# Patient Record
Sex: MALE | Race: OTHER | HISPANIC OR LATINO | URBAN - METROPOLITAN AREA
[De-identification: names, ages, dates, MRNs, and addresses within clinical notes are randomized per-mention and may not be internally consistent; named-entity substitution may affect disease eponyms.]

---

## 2022-12-15 ENCOUNTER — INPATIENT (INPATIENT)
Facility: HOSPITAL | Age: 57
LOS: 3 days | Discharge: HOME | DRG: 517 | End: 2022-12-19
Payer: OTHER MISCELLANEOUS

## 2022-12-15 ENCOUNTER — TRANSCRIPTION ENCOUNTER (OUTPATIENT)
Age: 57
End: 2022-12-15

## 2022-12-15 VITALS
OXYGEN SATURATION: 98 % | TEMPERATURE: 98 F | HEART RATE: 82 BPM | DIASTOLIC BLOOD PRESSURE: 75 MMHG | WEIGHT: 195.11 LBS | RESPIRATION RATE: 18 BRPM | SYSTOLIC BLOOD PRESSURE: 123 MMHG

## 2022-12-15 DIAGNOSIS — M54.50 LOW BACK PAIN, UNSPECIFIED: ICD-10-CM

## 2022-12-15 LAB
ALBUMIN SERPL ELPH-MCNC: 4.2 G/DL — SIGNIFICANT CHANGE UP (ref 3.3–5)
ALP SERPL-CCNC: 68 U/L — SIGNIFICANT CHANGE UP (ref 40–120)
ALT FLD-CCNC: 45 U/L — SIGNIFICANT CHANGE UP (ref 10–45)
ANION GAP SERPL CALC-SCNC: 9 MMOL/L — SIGNIFICANT CHANGE UP (ref 5–17)
APTT BLD: 36.6 SEC — HIGH (ref 27.5–35.5)
AST SERPL-CCNC: 36 U/L — SIGNIFICANT CHANGE UP (ref 10–40)
BASOPHILS # BLD AUTO: 0.04 K/UL — SIGNIFICANT CHANGE UP (ref 0–0.2)
BASOPHILS NFR BLD AUTO: 0.5 % — SIGNIFICANT CHANGE UP (ref 0–2)
BILIRUB SERPL-MCNC: 0.5 MG/DL — SIGNIFICANT CHANGE UP (ref 0.2–1.2)
BLD GP AB SCN SERPL QL: NEGATIVE — SIGNIFICANT CHANGE UP
BUN SERPL-MCNC: 16 MG/DL — SIGNIFICANT CHANGE UP (ref 7–23)
CALCIUM SERPL-MCNC: 9.6 MG/DL — SIGNIFICANT CHANGE UP (ref 8.4–10.5)
CHLORIDE SERPL-SCNC: 99 MMOL/L — SIGNIFICANT CHANGE UP (ref 96–108)
CO2 SERPL-SCNC: 28 MMOL/L — SIGNIFICANT CHANGE UP (ref 22–31)
CREAT SERPL-MCNC: 1.17 MG/DL — SIGNIFICANT CHANGE UP (ref 0.5–1.3)
EGFR: 72 ML/MIN/1.73M2 — SIGNIFICANT CHANGE UP
EOSINOPHIL # BLD AUTO: 0.06 K/UL — SIGNIFICANT CHANGE UP (ref 0–0.5)
EOSINOPHIL NFR BLD AUTO: 0.8 % — SIGNIFICANT CHANGE UP (ref 0–6)
GLUCOSE SERPL-MCNC: 101 MG/DL — HIGH (ref 70–99)
HCT VFR BLD CALC: 51.6 % — HIGH (ref 39–50)
HGB BLD-MCNC: 17.5 G/DL — HIGH (ref 13–17)
IMM GRANULOCYTES NFR BLD AUTO: 0.5 % — SIGNIFICANT CHANGE UP (ref 0–0.9)
INR BLD: 1.01 — SIGNIFICANT CHANGE UP (ref 0.88–1.16)
LYMPHOCYTES # BLD AUTO: 1.74 K/UL — SIGNIFICANT CHANGE UP (ref 1–3.3)
LYMPHOCYTES # BLD AUTO: 22.6 % — SIGNIFICANT CHANGE UP (ref 13–44)
MCHC RBC-ENTMCNC: 29.4 PG — SIGNIFICANT CHANGE UP (ref 27–34)
MCHC RBC-ENTMCNC: 33.9 GM/DL — SIGNIFICANT CHANGE UP (ref 32–36)
MCV RBC AUTO: 86.6 FL — SIGNIFICANT CHANGE UP (ref 80–100)
MONOCYTES # BLD AUTO: 0.82 K/UL — SIGNIFICANT CHANGE UP (ref 0–0.9)
MONOCYTES NFR BLD AUTO: 10.6 % — SIGNIFICANT CHANGE UP (ref 2–14)
NEUTROPHILS # BLD AUTO: 5 K/UL — SIGNIFICANT CHANGE UP (ref 1.8–7.4)
NEUTROPHILS NFR BLD AUTO: 65 % — SIGNIFICANT CHANGE UP (ref 43–77)
NRBC # BLD: 0 /100 WBCS — SIGNIFICANT CHANGE UP (ref 0–0)
PLATELET # BLD AUTO: 225 K/UL — SIGNIFICANT CHANGE UP (ref 150–400)
POTASSIUM SERPL-MCNC: 4.3 MMOL/L — SIGNIFICANT CHANGE UP (ref 3.5–5.3)
POTASSIUM SERPL-SCNC: 4.3 MMOL/L — SIGNIFICANT CHANGE UP (ref 3.5–5.3)
PROT SERPL-MCNC: 7.3 G/DL — SIGNIFICANT CHANGE UP (ref 6–8.3)
PROTHROM AB SERPL-ACNC: 12 SEC — SIGNIFICANT CHANGE UP (ref 10.5–13.4)
RBC # BLD: 5.96 M/UL — HIGH (ref 4.2–5.8)
RBC # FLD: 12.5 % — SIGNIFICANT CHANGE UP (ref 10.3–14.5)
RH IG SCN BLD-IMP: POSITIVE — SIGNIFICANT CHANGE UP
SARS-COV-2 RNA SPEC QL NAA+PROBE: SIGNIFICANT CHANGE UP
SODIUM SERPL-SCNC: 136 MMOL/L — SIGNIFICANT CHANGE UP (ref 135–145)
WBC # BLD: 7.7 K/UL — SIGNIFICANT CHANGE UP (ref 3.8–10.5)
WBC # FLD AUTO: 7.7 K/UL — SIGNIFICANT CHANGE UP (ref 3.8–10.5)

## 2022-12-15 PROCEDURE — 99222 1ST HOSP IP/OBS MODERATE 55: CPT

## 2022-12-15 PROCEDURE — 71046 X-RAY EXAM CHEST 2 VIEWS: CPT | Mod: 26

## 2022-12-15 PROCEDURE — 99285 EMERGENCY DEPT VISIT HI MDM: CPT | Mod: 25

## 2022-12-15 PROCEDURE — 93010 ELECTROCARDIOGRAM REPORT: CPT

## 2022-12-15 RX ORDER — CHLORHEXIDINE GLUCONATE 213 G/1000ML
1 SOLUTION TOPICAL ONCE
Refills: 0 | Status: COMPLETED | OUTPATIENT
Start: 2022-12-16 | End: 2022-12-16

## 2022-12-15 RX ORDER — BUPROPION HYDROCHLORIDE 150 MG/1
0 TABLET, EXTENDED RELEASE ORAL
Qty: 0 | Refills: 0 | DISCHARGE

## 2022-12-15 RX ORDER — LANOLIN ALCOHOL/MO/W.PET/CERES
5 CREAM (GRAM) TOPICAL AT BEDTIME
Refills: 0 | Status: DISCONTINUED | OUTPATIENT
Start: 2022-12-15 | End: 2022-12-19

## 2022-12-15 RX ORDER — HYDROMORPHONE HYDROCHLORIDE 2 MG/ML
0.5 INJECTION INTRAMUSCULAR; INTRAVENOUS; SUBCUTANEOUS EVERY 4 HOURS
Refills: 0 | Status: DISCONTINUED | OUTPATIENT
Start: 2022-12-15 | End: 2022-12-19

## 2022-12-15 RX ORDER — ACETAMINOPHEN 500 MG
975 TABLET ORAL EVERY 8 HOURS
Refills: 0 | Status: DISCONTINUED | OUTPATIENT
Start: 2022-12-15 | End: 2022-12-19

## 2022-12-15 RX ORDER — OXYCODONE HYDROCHLORIDE 5 MG/1
10 TABLET ORAL EVERY 4 HOURS
Refills: 0 | Status: DISCONTINUED | OUTPATIENT
Start: 2022-12-15 | End: 2022-12-17

## 2022-12-15 RX ORDER — POVIDONE-IODINE 5 %
1 AEROSOL (ML) TOPICAL ONCE
Refills: 0 | Status: COMPLETED | OUTPATIENT
Start: 2022-12-16 | End: 2022-12-16

## 2022-12-15 RX ORDER — OXYCODONE HYDROCHLORIDE 5 MG/1
5 TABLET ORAL EVERY 4 HOURS
Refills: 0 | Status: DISCONTINUED | OUTPATIENT
Start: 2022-12-15 | End: 2022-12-17

## 2022-12-15 RX ORDER — SENNA PLUS 8.6 MG/1
2 TABLET ORAL AT BEDTIME
Refills: 0 | Status: DISCONTINUED | OUTPATIENT
Start: 2022-12-15 | End: 2022-12-19

## 2022-12-15 RX ORDER — ONDANSETRON 8 MG/1
4 TABLET, FILM COATED ORAL EVERY 8 HOURS
Refills: 0 | Status: DISCONTINUED | OUTPATIENT
Start: 2022-12-15 | End: 2022-12-19

## 2022-12-15 RX ORDER — SODIUM CHLORIDE 9 MG/ML
1000 INJECTION, SOLUTION INTRAVENOUS
Refills: 0 | Status: DISCONTINUED | OUTPATIENT
Start: 2022-12-15 | End: 2022-12-19

## 2022-12-15 RX ORDER — APREPITANT 80 MG/1
40 CAPSULE ORAL ONCE
Refills: 0 | Status: COMPLETED | OUTPATIENT
Start: 2022-12-16 | End: 2022-12-16

## 2022-12-15 RX ADMIN — OXYCODONE HYDROCHLORIDE 10 MILLIGRAM(S): 5 TABLET ORAL at 15:35

## 2022-12-15 RX ADMIN — Medication 975 MILLIGRAM(S): at 21:14

## 2022-12-15 RX ADMIN — Medication 975 MILLIGRAM(S): at 22:14

## 2022-12-15 RX ADMIN — HYDROMORPHONE HYDROCHLORIDE 0.5 MILLIGRAM(S): 2 INJECTION INTRAMUSCULAR; INTRAVENOUS; SUBCUTANEOUS at 18:18

## 2022-12-15 RX ADMIN — OXYCODONE HYDROCHLORIDE 10 MILLIGRAM(S): 5 TABLET ORAL at 14:35

## 2022-12-15 RX ADMIN — SENNA PLUS 2 TABLET(S): 8.6 TABLET ORAL at 21:36

## 2022-12-15 RX ADMIN — HYDROMORPHONE HYDROCHLORIDE 0.5 MILLIGRAM(S): 2 INJECTION INTRAMUSCULAR; INTRAVENOUS; SUBCUTANEOUS at 18:33

## 2022-12-15 NOTE — ED PROVIDER NOTE - OBJECTIVE STATEMENT
Patient is a 57-year-old male history of L5-S1 laminectomy, bulging disk to L3/4, with neuroforaminal stenosis, who presents with acute on chronic low back pain described as pain across lower back worse to the right, radiating down bilateral lower extremities with associated numbness to bilateral lower extremities.  Patient denies any bowel or bladder dysfunction, saddle anesthesia.  Patient does admit to weakness of the lower extremities with legs giving out on him periodically.  Patient is now walking with a cane due to back pain.  No fevers, chills, abdominal pain, dysuria, hematuria.  Patient was referred to the ED for admission for back surgery.

## 2022-12-15 NOTE — CONSULT NOTE ADULT - SUBJECTIVE AND OBJECTIVE BOX
Patient is a 57y old  Male who presents with a chief complaint of  Low back pain       HPI : 57 year old male admitted to the hospital with Low back pain , he has anxiety takes bupropion 100mg daily , no other medical issues, his physical activity is limited by low back pain and radiculopathy but participates in isolated upper extremity and chest exercise for 40 mins 3-4 times a week and reports no angina or angina equivalents. He has no hx of CAD , CHF , MI , Renal failure , DM     REVIEW OF SYSTEMS: 12 point review of systems negative except those stated else where in the note       PAST MEDICAL & SURGICAL HISTORY: Back Surgery in 1995       Social History:  Denies cigarette smoking/etoh drinking  "occasioanlly" smokes cigars (15 Dec 2022 12:29)      FAMILY HISTORY: No family hx of early cad , stroke , cancer       Home Medications:  buPROPion 100 mg oral tablet: orally once a day (15 Dec 2022 12:27)      MEDICATIONS  (STANDING):  acetaminophen     Tablet .. 975 milliGRAM(s) Oral every 8 hours  lactated ringers. 1000 milliLiter(s) (120 mL/Hr) IV Continuous <Continuous>  senna 2 Tablet(s) Oral at bedtime    MEDICATIONS  (PRN):  bisacodyl 5 milliGRAM(s) Oral every 12 hours PRN Constipation  HYDROmorphone  Injectable 0.5 milliGRAM(s) IV Push every 4 hours PRN breakthrough pain  ondansetron Injectable 4 milliGRAM(s) IV Push every 8 hours PRN Nausea and/or Vomiting  oxyCODONE    IR 10 milliGRAM(s) Oral every 4 hours PRN Severe Pain (7 - 10)  oxyCODONE    IR 5 milliGRAM(s) Oral every 4 hours PRN Moderate Pain (4 - 6)      Vital Signs Last 24 Hrs  T(C): 36.6 (15 Dec 2022 14:29), Max: 36.8 (15 Dec 2022 10:50)  T(F): 97.8 (15 Dec 2022 14:29), Max: 98.2 (15 Dec 2022 10:50)  HR: 83 (15 Dec 2022 14:29) (82 - 83)  BP: 128/91 (15 Dec 2022 14:29) (123/75 - 128/91)  BP(mean): --  RR: 18 (15 Dec 2022 14:29) (18 - 18)  SpO2: 98% (15 Dec 2022 14:29) (98% - 98%)        LABS:                        17.5   7.70  )-----------( 225      ( 15 Dec 2022 12:19 )             51.6     12-15    136  |  99  |  16  ----------------------------<  101<H>  4.3   |  28  |  1.17    Ca    9.6      15 Dec 2022 12:19    TPro  7.3  /  Alb  4.2  /  TBili  0.5  /  DBili  x   /  AST  36  /  ALT  45  /  AlkPhos  68  12-15    PT/INR - ( 15 Dec 2022 12:19 )   PT: 12.0 sec;   INR: 1.01          PTT - ( 15 Dec 2022 12:19 )  PTT:36.6 sec    CAPILLARY BLOOD GLUCOSE                    RADIOLOGY & ADDITIONAL TESTS:    Imaging personally reviewed and radiologists report reviewed     Consultant(s) Notes Reviewed    PHYSICAL EXAM:  GENERAL: not in distress   HEAD, EYES: EOMI, PERRLA, conjunctiva and sclera clear  ENMT:  Moist mucous membranes, Good dentition, No lesions  NECK: Supple, No JVD, Normal thyroid  NERVOUS SYSTEM:  Alert & Oriented X3, Good concentration; Motor Strength 5/5 B/L upper and lower extremities; DTRs 2+ intact and symmetric  CHEST/LUNG: Clear to auscultation  bilaterally; No rales, rhonchi, wheezing,   HEART: Regular rate and rhythm; No murmurs, rubs, or gallops  ABDOMEN: Soft, Nontender, Nondistended; Bowel sounds present  EXTREMITIES:  2+ Peripheral Pulses, No clubbing, cyanosis, or edema  LYMPH: No lymphadenopathy noted  SKIN: No rashes or lesions    Care Discussed with Primary team  Patient is a 57y old  Male who presents with a chief complaint of  Low back pain       HPI : 57 year old male admitted to the hospital with Low back pain , he has anxiety takes bupropion 100mg daily , no other medical issues, his physical activity is limited by low back pain and radiculopathy but participates in isolated upper extremity and chest exercise for 40 mins 3-4 times a week and reports no angina or angina equivalents. He has no hx of CAD , CHF , MI , Renal failure , DM     REVIEW OF SYSTEMS: 12 point review of systems negative except those stated else where in the note       PAST MEDICAL & SURGICAL HISTORY: Back Surgery in 1995       Social History:  Denies cigarette smoking/etoh drinking  "occasioanlly" smokes cigars (15 Dec 2022 12:29)      FAMILY HISTORY: No family hx of early cad , stroke , cancer       Home Medications:  buPROPion 100 mg oral tablet: orally once a day (15 Dec 2022 12:27)      MEDICATIONS  (STANDING):  acetaminophen     Tablet .. 975 milliGRAM(s) Oral every 8 hours  lactated ringers. 1000 milliLiter(s) (120 mL/Hr) IV Continuous <Continuous>  senna 2 Tablet(s) Oral at bedtime    MEDICATIONS  (PRN):  bisacodyl 5 milliGRAM(s) Oral every 12 hours PRN Constipation  HYDROmorphone  Injectable 0.5 milliGRAM(s) IV Push every 4 hours PRN breakthrough pain  ondansetron Injectable 4 milliGRAM(s) IV Push every 8 hours PRN Nausea and/or Vomiting  oxyCODONE    IR 10 milliGRAM(s) Oral every 4 hours PRN Severe Pain (7 - 10)  oxyCODONE    IR 5 milliGRAM(s) Oral every 4 hours PRN Moderate Pain (4 - 6)      Vital Signs Last 24 Hrs  T(C): 36.6 (15 Dec 2022 14:29), Max: 36.8 (15 Dec 2022 10:50)  T(F): 97.8 (15 Dec 2022 14:29), Max: 98.2 (15 Dec 2022 10:50)  HR: 83 (15 Dec 2022 14:29) (82 - 83)  BP: 128/91 (15 Dec 2022 14:29) (123/75 - 128/91)  BP(mean): --  RR: 18 (15 Dec 2022 14:29) (18 - 18)  SpO2: 98% (15 Dec 2022 14:29) (98% - 98%)        LABS:                        17.5   7.70  )-----------( 225      ( 15 Dec 2022 12:19 )             51.6     12-15    136  |  99  |  16  ----------------------------<  101<H>  4.3   |  28  |  1.17    Ca    9.6      15 Dec 2022 12:19    TPro  7.3  /  Alb  4.2  /  TBili  0.5  /  DBili  x   /  AST  36  /  ALT  45  /  AlkPhos  68  12-15    PT/INR - ( 15 Dec 2022 12:19 )   PT: 12.0 sec;   INR: 1.01          PTT - ( 15 Dec 2022 12:19 )  PTT:36.6 sec    CAPILLARY BLOOD GLUCOSE                    RADIOLOGY & ADDITIONAL TESTS:    Imaging personally reviewed and radiologists report reviewed     Consultant(s) Notes Reviewed    PHYSICAL EXAM:  GENERAL: not in distress   HEAD, EYES: EOMI, PERRLA, conjunctiva and sclera clear  ENMT:  Moist mucous membranes, Good dentition, No lesions  NECK: Supple, No JVD, Normal thyroid  NERVOUS SYSTEM:  Alert & Oriented X3, Good concentration; Motor Strength 5/5 B/L upper and lower extremities  CHEST/LUNG: Clear to auscultation  bilaterally; No rales, rhonchi, wheezing,   HEART: Regular rate and rhythm; No murmurs, rubs, or gallops  ABDOMEN: Soft, Nontender, Nondistended; Bowel sounds present  EXTREMITIES:  2+ Peripheral Pulses, No clubbing, cyanosis, or edema  LYMPH: No lymphadenopathy noted  SKIN: No rashes or lesions    Care Discussed with Primary team

## 2022-12-15 NOTE — ED PROVIDER NOTE - CLINICAL SUMMARY MEDICAL DECISION MAKING FREE TEXT BOX
Impression: h/o L5-S1 laminectomy, bulging disk to L3/4, with neuroforaminal stenosis, here w/ acute on chronic back pain w/ associated numbness and weakness of b/l lower ext, r > l. No saddle anesthesia, bowel/ bladder dysfunction. No s/s of cauda equina. AVSS. Pt seen by ortho and to be admitted to Dr. Rodriguez's [facility] [place] for surgery tomorrow.

## 2022-12-15 NOTE — H&P ADULT - HISTORY OF PRESENT ILLNESS
The patient is a 57 year old male with history of back pain, sent to the ED by Dr. Rodriguez for worsening pain and further evaluation. The patient states he has a history of back pain -he had a L5-S1 laminectomy in 1995. He states that after this surgery he had been progressing well but then had an injury in 2004 while doing heavy lifting at work. He states that ever since he has had worsening low back pain and numbness down his right lower extremity [he states he also does have some numbness of his left lower extremity but that his right leg numbness is much worse]. Is ambulatory at baseline but has been using a cane for assistance. Takes percocet/meloxicam for his pain. Denies any significant past medical history.

## 2022-12-15 NOTE — CONSULT NOTE ADULT - ASSESSMENT
57 year old male admitted to the hospital with back pain and radiculopathy     Impression and Plan   # Pre op Risk eval   - RCRI Class 1 , 0.4% risk of periop mace within 30 days   - Low risk of Pulm Complications   - No hx of bleeding   - EKG pending   - Medically optimized for Laminectomy pending EKG     # Anxiety   - Continue Bupropion ,    # DVT Prophylaxis  57 year old male admitted to the hospital with back pain and radiculopathy     Impression and Plan   # Pre op Risk eval   - RCRI Class 1 , 0.4% risk of periop mace within 30 days   - Low risk of Pulm Complications   - No hx of bleeding   - EKG Sinus rhythm , no ST-T changes ,   - Medically optimized for Laminectomy with above specified risk     # Anxiety   - Continue Bupropion ,    # DVT Prophylaxis

## 2022-12-15 NOTE — ED PROVIDER NOTE - PHYSICAL EXAMINATION
VITAL SIGNS: I have reviewed nursing notes and confirm.  CONSTITUTIONAL: Well appearing, in no acute distress.   SKIN:  warm and dry, no acute rash.   HEAD:  normocephalic, atraumatic.  EYES: EOM intact; conjunctiva and sclera clear.  ENT: No nasal discharge; airway clear.   NECK: Supple.  CARD: S1, S2, Regular rate and rhythm.   RESP:  Clear to auscultation b/l, no wheezes, rales or rhonchi.  ABD: Normal bowel sounds; soft; non-distended; non-tender; no guarding/ rebound.  BACK: No spinal tenderness. No cvat. + mild ttp to r buttock.   EXT: Normal ROM. No peripheral edema. Pulses intact and equal b/l.  NEURO: Alert, oriented, motor intact and equal b/l. Sensation subj decreased to rle from thigh distally.  PSYCH: Cooperative, mood and affect appropriate.

## 2022-12-15 NOTE — H&P ADULT - NSHPPHYSICALEXAM_GEN_ALL_CORE
Constitutional: vitals reviewed per nursing documentation, NAD, sitting comfortably in chair in the ED  Eyes: , no obvious deformity, ecchymoses, swelling of eyelids  Neck: trachea midline  Lungs: not using accessory muscles of respiration, normal respiratory effort  Neuro/Psych:  normal affect and mood  MSK:   Skin warm and well perfused. PT pulses palpable bilateral lower extremities. Sensation intact to bilateral lower extremities, diminished to right lower extremity compared to left lower extremity. EHL/TA/GS/FHL 5/5 bilateral lower extremities.

## 2022-12-15 NOTE — H&P ADULT - PROBLEM SELECTOR PLAN 1
Admit to Orthopaedic Service Dr. Rodriguez.   -Plan for OR tomorrow for L3-4 laminectomy, L5-S1 extraforaminal decompression.  -Dr. De Leon consulted for medical co-management/pre-op optimization for OR tomorrow.  -Preop labs/EKG pending.   -Pain control.   -NPO/IVF at midnight for OR tomorrow.

## 2022-12-15 NOTE — ED ADULT TRIAGE NOTE - CHIEF COMPLAINT QUOTE
Patient c/o worsening lower back pain radiating to right lower leg x 1 week.  Hx L3, L4, L5 S1 herniated disc.  Told to come in by Dr. Rodriguez.  Denies saddle anesthesia, recent falls / injury

## 2022-12-15 NOTE — PRE-OP CHECKLIST - SELECT TESTS ORDERED
EKG/CXR BMP/CBC/CMP/PT/PTT/INR/Type and Screen/EKG/CXR BMP/CBC/CMP/PT/PTT/INR/Type and Screen/EKG/CXR/COVID-19

## 2022-12-16 ENCOUNTER — TRANSCRIPTION ENCOUNTER (OUTPATIENT)
Age: 57
End: 2022-12-16

## 2022-12-16 DIAGNOSIS — M48.061 SPINAL STENOSIS, LUMBAR REGION WITHOUT NEUROGENIC CLAUDICATION: ICD-10-CM

## 2022-12-16 LAB
ANION GAP SERPL CALC-SCNC: 11 MMOL/L — SIGNIFICANT CHANGE UP (ref 5–17)
BLD GP AB SCN SERPL QL: NEGATIVE — SIGNIFICANT CHANGE UP
BUN SERPL-MCNC: 19 MG/DL — SIGNIFICANT CHANGE UP (ref 7–23)
CALCIUM SERPL-MCNC: 9.2 MG/DL — SIGNIFICANT CHANGE UP (ref 8.4–10.5)
CHLORIDE SERPL-SCNC: 100 MMOL/L — SIGNIFICANT CHANGE UP (ref 96–108)
CO2 SERPL-SCNC: 25 MMOL/L — SIGNIFICANT CHANGE UP (ref 22–31)
CREAT SERPL-MCNC: 1.16 MG/DL — SIGNIFICANT CHANGE UP (ref 0.5–1.3)
EGFR: 73 ML/MIN/1.73M2 — SIGNIFICANT CHANGE UP
GLUCOSE SERPL-MCNC: 108 MG/DL — HIGH (ref 70–99)
HCT VFR BLD CALC: 49.7 % — SIGNIFICANT CHANGE UP (ref 39–50)
HGB BLD-MCNC: 16.9 G/DL — SIGNIFICANT CHANGE UP (ref 13–17)
MCHC RBC-ENTMCNC: 29.6 PG — SIGNIFICANT CHANGE UP (ref 27–34)
MCHC RBC-ENTMCNC: 34 GM/DL — SIGNIFICANT CHANGE UP (ref 32–36)
MCV RBC AUTO: 87 FL — SIGNIFICANT CHANGE UP (ref 80–100)
NRBC # BLD: 0 /100 WBCS — SIGNIFICANT CHANGE UP (ref 0–0)
PLATELET # BLD AUTO: 219 K/UL — SIGNIFICANT CHANGE UP (ref 150–400)
POTASSIUM SERPL-MCNC: 3.9 MMOL/L — SIGNIFICANT CHANGE UP (ref 3.5–5.3)
POTASSIUM SERPL-SCNC: 3.9 MMOL/L — SIGNIFICANT CHANGE UP (ref 3.5–5.3)
RBC # BLD: 5.71 M/UL — SIGNIFICANT CHANGE UP (ref 4.2–5.8)
RBC # FLD: 12.4 % — SIGNIFICANT CHANGE UP (ref 10.3–14.5)
RH IG SCN BLD-IMP: POSITIVE — SIGNIFICANT CHANGE UP
SODIUM SERPL-SCNC: 136 MMOL/L — SIGNIFICANT CHANGE UP (ref 135–145)
WBC # BLD: 8.56 K/UL — SIGNIFICANT CHANGE UP (ref 3.8–10.5)
WBC # FLD AUTO: 8.56 K/UL — SIGNIFICANT CHANGE UP (ref 3.8–10.5)

## 2022-12-16 PROCEDURE — 99232 SBSQ HOSP IP/OBS MODERATE 35: CPT

## 2022-12-16 DEVICE — SURGIFOAM PAD 8CM X 12.5CM X 10MM (100): Type: IMPLANTABLE DEVICE | Status: FUNCTIONAL

## 2022-12-16 DEVICE — SURGIFLO HEMOSTATIC MATRIX KIT: Type: IMPLANTABLE DEVICE | Status: FUNCTIONAL

## 2022-12-16 RX ORDER — CYCLOBENZAPRINE HYDROCHLORIDE 10 MG/1
10 TABLET, FILM COATED ORAL EVERY 8 HOURS
Refills: 0 | Status: DISCONTINUED | OUTPATIENT
Start: 2022-12-16 | End: 2022-12-19

## 2022-12-16 RX ORDER — SODIUM CHLORIDE 9 MG/ML
1000 INJECTION, SOLUTION INTRAVENOUS
Refills: 0 | Status: DISCONTINUED | OUTPATIENT
Start: 2022-12-16 | End: 2022-12-19

## 2022-12-16 RX ORDER — HYDROMORPHONE HYDROCHLORIDE 2 MG/ML
0.5 INJECTION INTRAMUSCULAR; INTRAVENOUS; SUBCUTANEOUS
Refills: 0 | Status: DISCONTINUED | OUTPATIENT
Start: 2022-12-16 | End: 2022-12-16

## 2022-12-16 RX ORDER — CEFAZOLIN SODIUM 1 G
2000 VIAL (EA) INJECTION EVERY 8 HOURS
Refills: 0 | Status: COMPLETED | OUTPATIENT
Start: 2022-12-16 | End: 2022-12-17

## 2022-12-16 RX ORDER — POLYETHYLENE GLYCOL 3350 17 G/17G
17 POWDER, FOR SOLUTION ORAL DAILY
Refills: 0 | Status: DISCONTINUED | OUTPATIENT
Start: 2022-12-16 | End: 2022-12-19

## 2022-12-16 RX ADMIN — APREPITANT 40 MILLIGRAM(S): 80 CAPSULE ORAL at 09:22

## 2022-12-16 RX ADMIN — Medication 975 MILLIGRAM(S): at 05:19

## 2022-12-16 RX ADMIN — HYDROMORPHONE HYDROCHLORIDE 0.5 MILLIGRAM(S): 2 INJECTION INTRAMUSCULAR; INTRAVENOUS; SUBCUTANEOUS at 16:48

## 2022-12-16 RX ADMIN — HYDROMORPHONE HYDROCHLORIDE 0.5 MILLIGRAM(S): 2 INJECTION INTRAMUSCULAR; INTRAVENOUS; SUBCUTANEOUS at 16:33

## 2022-12-16 RX ADMIN — Medication 975 MILLIGRAM(S): at 22:08

## 2022-12-16 RX ADMIN — HYDROMORPHONE HYDROCHLORIDE 0.5 MILLIGRAM(S): 2 INJECTION INTRAMUSCULAR; INTRAVENOUS; SUBCUTANEOUS at 15:00

## 2022-12-16 RX ADMIN — Medication 1 APPLICATION(S): at 09:27

## 2022-12-16 RX ADMIN — HYDROMORPHONE HYDROCHLORIDE 0.5 MILLIGRAM(S): 2 INJECTION INTRAMUSCULAR; INTRAVENOUS; SUBCUTANEOUS at 14:42

## 2022-12-16 RX ADMIN — SODIUM CHLORIDE 120 MILLILITER(S): 9 INJECTION, SOLUTION INTRAVENOUS at 00:40

## 2022-12-16 RX ADMIN — OXYCODONE HYDROCHLORIDE 10 MILLIGRAM(S): 5 TABLET ORAL at 22:07

## 2022-12-16 RX ADMIN — HYDROMORPHONE HYDROCHLORIDE 0.5 MILLIGRAM(S): 2 INJECTION INTRAMUSCULAR; INTRAVENOUS; SUBCUTANEOUS at 22:07

## 2022-12-16 RX ADMIN — HYDROMORPHONE HYDROCHLORIDE 0.5 MILLIGRAM(S): 2 INJECTION INTRAMUSCULAR; INTRAVENOUS; SUBCUTANEOUS at 22:37

## 2022-12-16 RX ADMIN — Medication 975 MILLIGRAM(S): at 21:08

## 2022-12-16 RX ADMIN — Medication 100 MILLIGRAM(S): at 21:10

## 2022-12-16 RX ADMIN — Medication 975 MILLIGRAM(S): at 06:19

## 2022-12-16 RX ADMIN — HYDROMORPHONE HYDROCHLORIDE 0.5 MILLIGRAM(S): 2 INJECTION INTRAMUSCULAR; INTRAVENOUS; SUBCUTANEOUS at 14:57

## 2022-12-16 RX ADMIN — CHLORHEXIDINE GLUCONATE 1 APPLICATION(S): 213 SOLUTION TOPICAL at 06:00

## 2022-12-16 RX ADMIN — SENNA PLUS 2 TABLET(S): 8.6 TABLET ORAL at 21:07

## 2022-12-16 RX ADMIN — HYDROMORPHONE HYDROCHLORIDE 0.5 MILLIGRAM(S): 2 INJECTION INTRAMUSCULAR; INTRAVENOUS; SUBCUTANEOUS at 15:15

## 2022-12-16 RX ADMIN — CYCLOBENZAPRINE HYDROCHLORIDE 10 MILLIGRAM(S): 10 TABLET, FILM COATED ORAL at 21:07

## 2022-12-16 RX ADMIN — OXYCODONE HYDROCHLORIDE 10 MILLIGRAM(S): 5 TABLET ORAL at 21:07

## 2022-12-16 NOTE — DISCHARGE NOTE PROVIDER - CARE PROVIDER_API CALL
Alex Rodriguez)  Orthopaedic Surgery  [address] [place] [address]  Phone: [phone]  Fax: [phone]  Follow Up Time: 2 weeks

## 2022-12-16 NOTE — DISCHARGE NOTE PROVIDER - NSDCMRMEDTOKEN_GEN_ALL_CORE_FT
buPROPion 100 mg oral tablet: orally once a day   buPROPion 100 mg oral tablet: orally once a day  Colace 100 mg oral capsule: 1 cap(s) orally 2 times a day   cyclobenzaprine 10 mg oral tablet: 1 tab(s) orally every 8 hours  HYDROmorphone 2 mg oral tablet: 1 tab(s) orally every 4 hours, As Needed -Moderate Pain (4 - 6) - for severe pain MDD:6  polyethylene glycol 3350 oral powder for reconstitution: 17 gram(s) orally once a day

## 2022-12-16 NOTE — DISCHARGE NOTE PROVIDER - NSDCFUADDINST_GEN_ALL_CORE_FT
No strenuous activity (bending/twisting), heavy lifting, driving or returning to work until cleared by MD.  Change dressing daily with gauze/tape till post-op day #7, then leave incision open to air.  You may shower, keep incision clean and dry.   Try to have regular bowel movements, take stool softener or laxative if necessary.    May apply ice to affected areas to decrease swelling.  Call to schedule an appt with Dr. Rodriguez for follow up, if you have staples or sutures they will be removed in office.  Contact your doctor if you experience: fever greater than 101.5, chills, chest pain, difficulty breathing, redness or excessive drainage around the incision, other concerns.  Follow up with your primary care provider.   No strenuous activity (bending/twisting), heavy lifting, driving or returning to work until cleared by MD.  Keep dressing clean and dry, remove in 72 hours and leave open to air. Leave steri strips in place, do not pick them off.   You may shower, keep incision clean and dry.   Try to have regular bowel movements, take stool softener or laxative if necessary.    May apply ice to affected areas to decrease swelling.  Call to schedule an appt with Dr. Rodriguez for follow up, if you have staples or sutures they will be removed in office.  Contact your doctor if you experience: fever greater than 101.5, chills, chest pain, difficulty breathing, redness or excessive drainage around the incision, other concerns.  Follow up with your primary care provider.

## 2022-12-16 NOTE — BRIEF OPERATIVE NOTE - NSICDXBRIEFPREOP_GEN_ALL_CORE_FT
PRE-OP DIAGNOSIS:  Lumbar spinal stenosis 16-Dec-2022 13:31:05  Melany Hernández  Low back pain 16-Dec-2022 13:32:13  Melany Hernández

## 2022-12-16 NOTE — DISCHARGE NOTE PROVIDER - NSDCCPCAREPLAN_GEN_ALL_CORE_FT
PRINCIPAL DISCHARGE DIAGNOSIS  Diagnosis: Back pain  Assessment and Plan of Treatment: L5-S1 decompression

## 2022-12-16 NOTE — DISCHARGE NOTE PROVIDER - HOSPITAL COURSE
Admitted 12/15/22 with BLE numbness R>L.  Surgery 12/16/22- L5-S1 decompression  Lyric-op Antibiotics  Pain control  DVT prophylaxis  OOB/Physical Therapy   Admitted 12/15/22 with BLE numbness to right worse than left leg   Surgery 12/16/22- L5-S1 decompression, laminectomy   Lyric-op Antibiotics- Ancef   Pain control  DVT prophylaxis- SCDs,   OOB/Physical Therapy, WBAT   Cleared by PT for discharge home

## 2022-12-16 NOTE — BRIEF OPERATIVE NOTE - NSICDXBRIEFPOSTOP_GEN_ALL_CORE_FT
POST-OP DIAGNOSIS:  Lumbar stenosis 16-Dec-2022 13:32:43  Melany Hernández  Low back pain 16-Dec-2022 13:32:55  Melany Hernández

## 2022-12-16 NOTE — BRIEF OPERATIVE NOTE - NSICDXBRIEFPROCEDURE_GEN_ALL_CORE_FT
PROCEDURES:  Lumbar laminectomy 16-Dec-2022 13:30:12  Melany Hernández  Lumbar decompression 16-Dec-2022 13:30:41  Melany Hernández

## 2022-12-17 LAB
ANION GAP SERPL CALC-SCNC: 10 MMOL/L — SIGNIFICANT CHANGE UP (ref 5–17)
BUN SERPL-MCNC: 20 MG/DL — SIGNIFICANT CHANGE UP (ref 7–23)
CALCIUM SERPL-MCNC: 8.8 MG/DL — SIGNIFICANT CHANGE UP (ref 8.4–10.5)
CHLORIDE SERPL-SCNC: 98 MMOL/L — SIGNIFICANT CHANGE UP (ref 96–108)
CO2 SERPL-SCNC: 27 MMOL/L — SIGNIFICANT CHANGE UP (ref 22–31)
CREAT SERPL-MCNC: 1.27 MG/DL — SIGNIFICANT CHANGE UP (ref 0.5–1.3)
EGFR: 65 ML/MIN/1.73M2 — SIGNIFICANT CHANGE UP
GLUCOSE SERPL-MCNC: 180 MG/DL — HIGH (ref 70–99)
HCT VFR BLD CALC: 42.4 % — SIGNIFICANT CHANGE UP (ref 39–50)
HGB BLD-MCNC: 14.3 G/DL — SIGNIFICANT CHANGE UP (ref 13–17)
MCHC RBC-ENTMCNC: 29.5 PG — SIGNIFICANT CHANGE UP (ref 27–34)
MCHC RBC-ENTMCNC: 33.7 GM/DL — SIGNIFICANT CHANGE UP (ref 32–36)
MCV RBC AUTO: 87.6 FL — SIGNIFICANT CHANGE UP (ref 80–100)
NRBC # BLD: 0 /100 WBCS — SIGNIFICANT CHANGE UP (ref 0–0)
PLATELET # BLD AUTO: 205 K/UL — SIGNIFICANT CHANGE UP (ref 150–400)
POTASSIUM SERPL-MCNC: 4.1 MMOL/L — SIGNIFICANT CHANGE UP (ref 3.5–5.3)
POTASSIUM SERPL-SCNC: 4.1 MMOL/L — SIGNIFICANT CHANGE UP (ref 3.5–5.3)
RBC # BLD: 4.84 M/UL — SIGNIFICANT CHANGE UP (ref 4.2–5.8)
RBC # FLD: 12.4 % — SIGNIFICANT CHANGE UP (ref 10.3–14.5)
SODIUM SERPL-SCNC: 135 MMOL/L — SIGNIFICANT CHANGE UP (ref 135–145)
WBC # BLD: 13.61 K/UL — HIGH (ref 3.8–10.5)
WBC # FLD AUTO: 13.61 K/UL — HIGH (ref 3.8–10.5)

## 2022-12-17 PROCEDURE — 99232 SBSQ HOSP IP/OBS MODERATE 35: CPT

## 2022-12-17 RX ORDER — INFLUENZA VIRUS VACCINE 15; 15; 15; 15 UG/.5ML; UG/.5ML; UG/.5ML; UG/.5ML
0.5 SUSPENSION INTRAMUSCULAR ONCE
Refills: 0 | Status: DISCONTINUED | OUTPATIENT
Start: 2022-12-17 | End: 2022-12-19

## 2022-12-17 RX ORDER — HYDROMORPHONE HYDROCHLORIDE 2 MG/ML
2 INJECTION INTRAMUSCULAR; INTRAVENOUS; SUBCUTANEOUS EVERY 4 HOURS
Refills: 0 | Status: DISCONTINUED | OUTPATIENT
Start: 2022-12-17 | End: 2022-12-19

## 2022-12-17 RX ORDER — HYDROMORPHONE HYDROCHLORIDE 2 MG/ML
4 INJECTION INTRAMUSCULAR; INTRAVENOUS; SUBCUTANEOUS EVERY 4 HOURS
Refills: 0 | Status: DISCONTINUED | OUTPATIENT
Start: 2022-12-17 | End: 2022-12-19

## 2022-12-17 RX ADMIN — HYDROMORPHONE HYDROCHLORIDE 0.5 MILLIGRAM(S): 2 INJECTION INTRAMUSCULAR; INTRAVENOUS; SUBCUTANEOUS at 09:15

## 2022-12-17 RX ADMIN — CYCLOBENZAPRINE HYDROCHLORIDE 10 MILLIGRAM(S): 10 TABLET, FILM COATED ORAL at 05:02

## 2022-12-17 RX ADMIN — HYDROMORPHONE HYDROCHLORIDE 4 MILLIGRAM(S): 2 INJECTION INTRAMUSCULAR; INTRAVENOUS; SUBCUTANEOUS at 07:24

## 2022-12-17 RX ADMIN — HYDROMORPHONE HYDROCHLORIDE 0.5 MILLIGRAM(S): 2 INJECTION INTRAMUSCULAR; INTRAVENOUS; SUBCUTANEOUS at 16:00

## 2022-12-17 RX ADMIN — HYDROMORPHONE HYDROCHLORIDE 0.5 MILLIGRAM(S): 2 INJECTION INTRAMUSCULAR; INTRAVENOUS; SUBCUTANEOUS at 15:49

## 2022-12-17 RX ADMIN — Medication 5 MILLIGRAM(S): at 22:14

## 2022-12-17 RX ADMIN — CYCLOBENZAPRINE HYDROCHLORIDE 10 MILLIGRAM(S): 10 TABLET, FILM COATED ORAL at 16:46

## 2022-12-17 RX ADMIN — SENNA PLUS 2 TABLET(S): 8.6 TABLET ORAL at 22:13

## 2022-12-17 RX ADMIN — Medication 975 MILLIGRAM(S): at 23:13

## 2022-12-17 RX ADMIN — Medication 975 MILLIGRAM(S): at 06:02

## 2022-12-17 RX ADMIN — HYDROMORPHONE HYDROCHLORIDE 4 MILLIGRAM(S): 2 INJECTION INTRAMUSCULAR; INTRAVENOUS; SUBCUTANEOUS at 11:34

## 2022-12-17 RX ADMIN — Medication 975 MILLIGRAM(S): at 22:13

## 2022-12-17 RX ADMIN — HYDROMORPHONE HYDROCHLORIDE 4 MILLIGRAM(S): 2 INJECTION INTRAMUSCULAR; INTRAVENOUS; SUBCUTANEOUS at 12:34

## 2022-12-17 RX ADMIN — HYDROMORPHONE HYDROCHLORIDE 4 MILLIGRAM(S): 2 INJECTION INTRAMUSCULAR; INTRAVENOUS; SUBCUTANEOUS at 08:24

## 2022-12-17 RX ADMIN — HYDROMORPHONE HYDROCHLORIDE 4 MILLIGRAM(S): 2 INJECTION INTRAMUSCULAR; INTRAVENOUS; SUBCUTANEOUS at 17:32

## 2022-12-17 RX ADMIN — HYDROMORPHONE HYDROCHLORIDE 0.5 MILLIGRAM(S): 2 INJECTION INTRAMUSCULAR; INTRAVENOUS; SUBCUTANEOUS at 20:48

## 2022-12-17 RX ADMIN — HYDROMORPHONE HYDROCHLORIDE 0.5 MILLIGRAM(S): 2 INJECTION INTRAMUSCULAR; INTRAVENOUS; SUBCUTANEOUS at 20:33

## 2022-12-17 RX ADMIN — Medication 100 MILLIGRAM(S): at 05:01

## 2022-12-17 RX ADMIN — HYDROMORPHONE HYDROCHLORIDE 4 MILLIGRAM(S): 2 INJECTION INTRAMUSCULAR; INTRAVENOUS; SUBCUTANEOUS at 18:32

## 2022-12-17 RX ADMIN — HYDROMORPHONE HYDROCHLORIDE 0.5 MILLIGRAM(S): 2 INJECTION INTRAMUSCULAR; INTRAVENOUS; SUBCUTANEOUS at 08:54

## 2022-12-17 RX ADMIN — CYCLOBENZAPRINE HYDROCHLORIDE 10 MILLIGRAM(S): 10 TABLET, FILM COATED ORAL at 22:13

## 2022-12-17 RX ADMIN — POLYETHYLENE GLYCOL 3350 17 GRAM(S): 17 POWDER, FOR SOLUTION ORAL at 12:21

## 2022-12-17 RX ADMIN — Medication 975 MILLIGRAM(S): at 05:02

## 2022-12-17 NOTE — PATIENT PROFILE ADULT - FALL HARM RISK - HARM RISK INTERVENTIONS
Assistance with ambulation/Assistance OOB with selected safe patient handling equipment/Communicate Risk of Fall with Harm to all staff/Discuss with provider need for PT consult/Monitor gait and stability/Provide patient with walking aids - walker, cane, crutches/Reinforce activity limits and safety measures with patient and family/Sit up slowly, dangle for a short time, stand at bedside before walking/Tailored Fall Risk Interventions/Use of alarms - bed, chair and/or voice tab/Visual Cue: Yellow wristband and red socks/Bed in lowest position, wheels locked, appropriate side rails in place/Call bell, personal items and telephone in reach/Instruct patient to call for assistance before getting out of bed or chair/Non-slip footwear when patient is out of bed/[place] to call system/Physically safe environment - no spills, clutter or unnecessary equipment/Purposeful Proactive Rounding/Room/bathroom lighting operational, light cord in reach

## 2022-12-17 NOTE — PATIENT PROFILE ADULT - FUNCTIONAL ASSESSMENT - BASIC MOBILITY SECTION LABEL
James Helm is a 13 y.o. 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  Patient seen in cardiology clinic 9/21 with abdominal pain and shortness of breath starting previous night with poor appetite. No fever.  Emesis once.  No syncope.  Some chest pain last week but none today.  No cough or URI symptoms.  Reports good compliance with meds.  Echo performed in clinic showed poor ejection fraction and was concerning for rejection which prompted admission to CVICU.      .

## 2022-12-17 NOTE — PATIENT PROFILE ADULT - FUNCTIONAL ASSESSMENT - BASIC MOBILITY 6.
4-calculated by average/Not able to assess (calculate score using [facility] averaging method)

## 2022-12-17 NOTE — PHYSICAL THERAPY INITIAL EVALUATION ADULT - PERTINENT HX OF CURRENT PROBLEM, REHAB EVAL
The patient is a 57 year old male with history of back pain, sent to the ED by Dr. Rodriguez for worsening pain and further evaluation. The patient states he has a history of back pain -he had a L5-S1 laminectomy in 1995. He states that after this surgery he had been progressing well but then had an injury in 2004 while doing heavy lifting at work. He states that ever since he has had worsening low back pain and numbness down his right lower extremity

## 2022-12-17 NOTE — PHYSICAL THERAPY INITIAL EVALUATION ADULT - ADDITIONAL COMMENTS
Patient lives alone in apartment with 'a few steps to enter'. Ambulates with SC prior to admission. Denies recent Hx of falls.

## 2022-12-17 NOTE — PHYSICAL THERAPY INITIAL EVALUATION ADULT - GENERAL OBSERVATIONS, REHAB EVAL
Patient received standing with Resident Mikey  in NAD on RA, +Hemovac, +Heplock. Cleared by WILLIS Tucker. Agreeable to PT.

## 2022-12-18 LAB
ANION GAP SERPL CALC-SCNC: 9 MMOL/L — SIGNIFICANT CHANGE UP (ref 5–17)
BASOPHILS # BLD AUTO: 0.05 K/UL — SIGNIFICANT CHANGE UP (ref 0–0.2)
BASOPHILS NFR BLD AUTO: 0.4 % — SIGNIFICANT CHANGE UP (ref 0–2)
BUN SERPL-MCNC: 15 MG/DL — SIGNIFICANT CHANGE UP (ref 7–23)
CALCIUM SERPL-MCNC: 8.7 MG/DL — SIGNIFICANT CHANGE UP (ref 8.4–10.5)
CHLORIDE SERPL-SCNC: 100 MMOL/L — SIGNIFICANT CHANGE UP (ref 96–108)
CO2 SERPL-SCNC: 27 MMOL/L — SIGNIFICANT CHANGE UP (ref 22–31)
CREAT SERPL-MCNC: 1.07 MG/DL — SIGNIFICANT CHANGE UP (ref 0.5–1.3)
EGFR: 80 ML/MIN/1.73M2 — SIGNIFICANT CHANGE UP
EOSINOPHIL # BLD AUTO: 0.07 K/UL — SIGNIFICANT CHANGE UP (ref 0–0.5)
EOSINOPHIL NFR BLD AUTO: 0.6 % — SIGNIFICANT CHANGE UP (ref 0–6)
GLUCOSE SERPL-MCNC: 94 MG/DL — SIGNIFICANT CHANGE UP (ref 70–99)
HCT VFR BLD CALC: 44.4 % — SIGNIFICANT CHANGE UP (ref 39–50)
HGB BLD-MCNC: 14.7 G/DL — SIGNIFICANT CHANGE UP (ref 13–17)
IMM GRANULOCYTES NFR BLD AUTO: 0.5 % — SIGNIFICANT CHANGE UP (ref 0–0.9)
LYMPHOCYTES # BLD AUTO: 17.1 % — SIGNIFICANT CHANGE UP (ref 13–44)
LYMPHOCYTES # BLD AUTO: 2.06 K/UL — SIGNIFICANT CHANGE UP (ref 1–3.3)
MCHC RBC-ENTMCNC: 29.2 PG — SIGNIFICANT CHANGE UP (ref 27–34)
MCHC RBC-ENTMCNC: 33.1 GM/DL — SIGNIFICANT CHANGE UP (ref 32–36)
MCV RBC AUTO: 88.3 FL — SIGNIFICANT CHANGE UP (ref 80–100)
MONOCYTES # BLD AUTO: 1.3 K/UL — HIGH (ref 0–0.9)
MONOCYTES NFR BLD AUTO: 10.8 % — SIGNIFICANT CHANGE UP (ref 2–14)
NEUTROPHILS # BLD AUTO: 8.5 K/UL — HIGH (ref 1.8–7.4)
NEUTROPHILS NFR BLD AUTO: 70.6 % — SIGNIFICANT CHANGE UP (ref 43–77)
NRBC # BLD: 0 /100 WBCS — SIGNIFICANT CHANGE UP (ref 0–0)
PLATELET # BLD AUTO: 210 K/UL — SIGNIFICANT CHANGE UP (ref 150–400)
POTASSIUM SERPL-MCNC: 4.2 MMOL/L — SIGNIFICANT CHANGE UP (ref 3.5–5.3)
POTASSIUM SERPL-SCNC: 4.2 MMOL/L — SIGNIFICANT CHANGE UP (ref 3.5–5.3)
RBC # BLD: 5.03 M/UL — SIGNIFICANT CHANGE UP (ref 4.2–5.8)
RBC # FLD: 12.7 % — SIGNIFICANT CHANGE UP (ref 10.3–14.5)
SODIUM SERPL-SCNC: 136 MMOL/L — SIGNIFICANT CHANGE UP (ref 135–145)
WBC # BLD: 12.04 K/UL — HIGH (ref 3.8–10.5)
WBC # FLD AUTO: 12.04 K/UL — HIGH (ref 3.8–10.5)

## 2022-12-18 PROCEDURE — 99232 SBSQ HOSP IP/OBS MODERATE 35: CPT | Mod: GC

## 2022-12-18 RX ORDER — MAGNESIUM HYDROXIDE 400 MG/1
30 TABLET, CHEWABLE ORAL DAILY
Refills: 0 | Status: DISCONTINUED | OUTPATIENT
Start: 2022-12-18 | End: 2022-12-19

## 2022-12-18 RX ADMIN — HYDROMORPHONE HYDROCHLORIDE 4 MILLIGRAM(S): 2 INJECTION INTRAMUSCULAR; INTRAVENOUS; SUBCUTANEOUS at 22:09

## 2022-12-18 RX ADMIN — HYDROMORPHONE HYDROCHLORIDE 4 MILLIGRAM(S): 2 INJECTION INTRAMUSCULAR; INTRAVENOUS; SUBCUTANEOUS at 12:04

## 2022-12-18 RX ADMIN — HYDROMORPHONE HYDROCHLORIDE 0.5 MILLIGRAM(S): 2 INJECTION INTRAMUSCULAR; INTRAVENOUS; SUBCUTANEOUS at 06:30

## 2022-12-18 RX ADMIN — HYDROMORPHONE HYDROCHLORIDE 4 MILLIGRAM(S): 2 INJECTION INTRAMUSCULAR; INTRAVENOUS; SUBCUTANEOUS at 17:42

## 2022-12-18 RX ADMIN — HYDROMORPHONE HYDROCHLORIDE 0.5 MILLIGRAM(S): 2 INJECTION INTRAMUSCULAR; INTRAVENOUS; SUBCUTANEOUS at 10:12

## 2022-12-18 RX ADMIN — Medication 975 MILLIGRAM(S): at 14:37

## 2022-12-18 RX ADMIN — HYDROMORPHONE HYDROCHLORIDE 4 MILLIGRAM(S): 2 INJECTION INTRAMUSCULAR; INTRAVENOUS; SUBCUTANEOUS at 23:14

## 2022-12-18 RX ADMIN — HYDROMORPHONE HYDROCHLORIDE 0.5 MILLIGRAM(S): 2 INJECTION INTRAMUSCULAR; INTRAVENOUS; SUBCUTANEOUS at 19:06

## 2022-12-18 RX ADMIN — MAGNESIUM HYDROXIDE 30 MILLILITER(S): 400 TABLET, CHEWABLE ORAL at 23:03

## 2022-12-18 RX ADMIN — HYDROMORPHONE HYDROCHLORIDE 0.5 MILLIGRAM(S): 2 INJECTION INTRAMUSCULAR; INTRAVENOUS; SUBCUTANEOUS at 06:05

## 2022-12-18 RX ADMIN — Medication 975 MILLIGRAM(S): at 23:09

## 2022-12-18 RX ADMIN — HYDROMORPHONE HYDROCHLORIDE 0.5 MILLIGRAM(S): 2 INJECTION INTRAMUSCULAR; INTRAVENOUS; SUBCUTANEOUS at 23:20

## 2022-12-18 RX ADMIN — HYDROMORPHONE HYDROCHLORIDE 4 MILLIGRAM(S): 2 INJECTION INTRAMUSCULAR; INTRAVENOUS; SUBCUTANEOUS at 13:04

## 2022-12-18 RX ADMIN — Medication 975 MILLIGRAM(S): at 22:09

## 2022-12-18 RX ADMIN — SENNA PLUS 2 TABLET(S): 8.6 TABLET ORAL at 22:09

## 2022-12-18 RX ADMIN — HYDROMORPHONE HYDROCHLORIDE 0.5 MILLIGRAM(S): 2 INJECTION INTRAMUSCULAR; INTRAVENOUS; SUBCUTANEOUS at 19:21

## 2022-12-18 RX ADMIN — HYDROMORPHONE HYDROCHLORIDE 0.5 MILLIGRAM(S): 2 INJECTION INTRAMUSCULAR; INTRAVENOUS; SUBCUTANEOUS at 14:38

## 2022-12-18 RX ADMIN — Medication 5 MILLIGRAM(S): at 22:12

## 2022-12-18 RX ADMIN — Medication 5 MILLIGRAM(S): at 10:12

## 2022-12-18 RX ADMIN — HYDROMORPHONE HYDROCHLORIDE 4 MILLIGRAM(S): 2 INJECTION INTRAMUSCULAR; INTRAVENOUS; SUBCUTANEOUS at 08:03

## 2022-12-18 RX ADMIN — HYDROMORPHONE HYDROCHLORIDE 4 MILLIGRAM(S): 2 INJECTION INTRAMUSCULAR; INTRAVENOUS; SUBCUTANEOUS at 07:03

## 2022-12-18 RX ADMIN — Medication 975 MILLIGRAM(S): at 07:04

## 2022-12-18 RX ADMIN — Medication 975 MILLIGRAM(S): at 06:04

## 2022-12-18 RX ADMIN — HYDROMORPHONE HYDROCHLORIDE 0.5 MILLIGRAM(S): 2 INJECTION INTRAMUSCULAR; INTRAVENOUS; SUBCUTANEOUS at 10:27

## 2022-12-18 RX ADMIN — CYCLOBENZAPRINE HYDROCHLORIDE 10 MILLIGRAM(S): 10 TABLET, FILM COATED ORAL at 14:37

## 2022-12-18 RX ADMIN — CYCLOBENZAPRINE HYDROCHLORIDE 10 MILLIGRAM(S): 10 TABLET, FILM COATED ORAL at 06:04

## 2022-12-18 RX ADMIN — HYDROMORPHONE HYDROCHLORIDE 4 MILLIGRAM(S): 2 INJECTION INTRAMUSCULAR; INTRAVENOUS; SUBCUTANEOUS at 16:42

## 2022-12-18 RX ADMIN — CYCLOBENZAPRINE HYDROCHLORIDE 10 MILLIGRAM(S): 10 TABLET, FILM COATED ORAL at 22:09

## 2022-12-18 RX ADMIN — POLYETHYLENE GLYCOL 3350 17 GRAM(S): 17 POWDER, FOR SOLUTION ORAL at 12:04

## 2022-12-18 RX ADMIN — HYDROMORPHONE HYDROCHLORIDE 0.5 MILLIGRAM(S): 2 INJECTION INTRAMUSCULAR; INTRAVENOUS; SUBCUTANEOUS at 14:53

## 2022-12-19 ENCOUNTER — TRANSCRIPTION ENCOUNTER (OUTPATIENT)
Age: 57
End: 2022-12-19

## 2022-12-19 VITALS
SYSTOLIC BLOOD PRESSURE: 133 MMHG | HEART RATE: 83 BPM | TEMPERATURE: 98 F | OXYGEN SATURATION: 97 % | DIASTOLIC BLOOD PRESSURE: 81 MMHG | RESPIRATION RATE: 14 BRPM

## 2022-12-19 PROCEDURE — 36415 COLL VENOUS BLD VENIPUNCTURE: CPT

## 2022-12-19 PROCEDURE — 71046 X-RAY EXAM CHEST 2 VIEWS: CPT

## 2022-12-19 PROCEDURE — C1889: CPT

## 2022-12-19 PROCEDURE — 76000 FLUOROSCOPY <1 HR PHYS/QHP: CPT

## 2022-12-19 PROCEDURE — 99285 EMERGENCY DEPT VISIT HI MDM: CPT | Mod: 25

## 2022-12-19 PROCEDURE — 85610 PROTHROMBIN TIME: CPT

## 2022-12-19 PROCEDURE — 86900 BLOOD TYPING SEROLOGIC ABO: CPT

## 2022-12-19 PROCEDURE — 87635 SARS-COV-2 COVID-19 AMP PRB: CPT

## 2022-12-19 PROCEDURE — 86901 BLOOD TYPING SEROLOGIC RH(D): CPT

## 2022-12-19 PROCEDURE — 85027 COMPLETE CBC AUTOMATED: CPT

## 2022-12-19 PROCEDURE — 80053 COMPREHEN METABOLIC PANEL: CPT

## 2022-12-19 PROCEDURE — 80048 BASIC METABOLIC PNL TOTAL CA: CPT

## 2022-12-19 PROCEDURE — 85025 COMPLETE CBC W/AUTO DIFF WBC: CPT

## 2022-12-19 PROCEDURE — 99232 SBSQ HOSP IP/OBS MODERATE 35: CPT

## 2022-12-19 PROCEDURE — 86850 RBC ANTIBODY SCREEN: CPT

## 2022-12-19 PROCEDURE — 97161 PT EVAL LOW COMPLEX 20 MIN: CPT

## 2022-12-19 PROCEDURE — 85730 THROMBOPLASTIN TIME PARTIAL: CPT

## 2022-12-19 RX ORDER — CYCLOBENZAPRINE HYDROCHLORIDE 10 MG/1
1 TABLET, FILM COATED ORAL
Qty: 15 | Refills: 0
Start: 2022-12-19 | End: 2022-12-23

## 2022-12-19 RX ORDER — DOCUSATE SODIUM 100 MG
1 CAPSULE ORAL
Qty: 28 | Refills: 0
Start: 2022-12-19

## 2022-12-19 RX ORDER — HYDROMORPHONE HYDROCHLORIDE 2 MG/ML
1 INJECTION INTRAMUSCULAR; INTRAVENOUS; SUBCUTANEOUS
Qty: 25 | Refills: 0
Start: 2022-12-19

## 2022-12-19 RX ORDER — POLYETHYLENE GLYCOL 3350 17 G/17G
17 POWDER, FOR SOLUTION ORAL
Qty: 0 | Refills: 0 | DISCHARGE
Start: 2022-12-19

## 2022-12-19 RX ADMIN — HYDROMORPHONE HYDROCHLORIDE 0.5 MILLIGRAM(S): 2 INJECTION INTRAMUSCULAR; INTRAVENOUS; SUBCUTANEOUS at 00:20

## 2022-12-19 RX ADMIN — Medication 975 MILLIGRAM(S): at 05:50

## 2022-12-19 RX ADMIN — HYDROMORPHONE HYDROCHLORIDE 0.5 MILLIGRAM(S): 2 INJECTION INTRAMUSCULAR; INTRAVENOUS; SUBCUTANEOUS at 13:43

## 2022-12-19 RX ADMIN — HYDROMORPHONE HYDROCHLORIDE 4 MILLIGRAM(S): 2 INJECTION INTRAMUSCULAR; INTRAVENOUS; SUBCUTANEOUS at 12:32

## 2022-12-19 RX ADMIN — POLYETHYLENE GLYCOL 3350 17 GRAM(S): 17 POWDER, FOR SOLUTION ORAL at 13:20

## 2022-12-19 RX ADMIN — Medication 975 MILLIGRAM(S): at 06:51

## 2022-12-19 RX ADMIN — CYCLOBENZAPRINE HYDROCHLORIDE 10 MILLIGRAM(S): 10 TABLET, FILM COATED ORAL at 05:51

## 2022-12-19 RX ADMIN — CYCLOBENZAPRINE HYDROCHLORIDE 10 MILLIGRAM(S): 10 TABLET, FILM COATED ORAL at 13:20

## 2022-12-19 RX ADMIN — HYDROMORPHONE HYDROCHLORIDE 4 MILLIGRAM(S): 2 INJECTION INTRAMUSCULAR; INTRAVENOUS; SUBCUTANEOUS at 06:51

## 2022-12-19 RX ADMIN — Medication 10 MILLIGRAM(S): at 05:50

## 2022-12-19 RX ADMIN — HYDROMORPHONE HYDROCHLORIDE 4 MILLIGRAM(S): 2 INJECTION INTRAMUSCULAR; INTRAVENOUS; SUBCUTANEOUS at 05:50

## 2022-12-19 RX ADMIN — HYDROMORPHONE HYDROCHLORIDE 0.5 MILLIGRAM(S): 2 INJECTION INTRAMUSCULAR; INTRAVENOUS; SUBCUTANEOUS at 07:10

## 2022-12-19 RX ADMIN — Medication 975 MILLIGRAM(S): at 13:20

## 2022-12-19 NOTE — PROGRESS NOTE ADULT - SUBJECTIVE AND OBJECTIVE BOX
Ortho Note    Pt seen and examined on morning rounds. Patient does not have any help at home so is concerned that he wont be able to function without help.  Denies CP, SOB, N/V, numbness/tingling     Vital Signs Last 24 Hrs  T(C): --  T(F): --  HR: --  BP: --  BP(mean): --  RR: --  SpO2: --  I&O's Summary    17 Dec 2022 07:01  -  18 Dec 2022 07:00  --------------------------------------------------------  IN: 920 mL / OUT: 1050 mL / NET: -130 mL    18 Dec 2022 07:01  -  19 Dec 2022 06:13  --------------------------------------------------------  IN: 300 mL / OUT: 500 mL / NET: -200 mL        Physical Exam:  General: Pt Alert and oriented, NAD  Dsg C/D/I  MSK:   Skin warm and well perfused.   PT pulses palpable bilateral lower extremities.   Sensation intact to bilateral lower extremities, diminished to right lower extremity compared to left lower extremity.   EHL/TA/GS/FHL 5/5 bilateral lower extremities.                          14.7   12.04 )-----------( 210      ( 18 Dec 2022 05:30 )             44.4     12-18    136  |  100  |  15  ----------------------------<  94  4.2   |  27  |  1.07    Ca    8.7      18 Dec 2022 05:30        57M s/p L3-4 laminectomy, L5-S1 extraforaminal decompression with Dr. Rodriguez on 12/16  - Stable  - Pain control  - WBAT  - SCDs  - Ortho Stable for D/c    Mikey Saul, PGY-2  Ortho Pager [phone]
Ortho Note    Pt seen and examined on morning rounds. Pt comfortable without complaints, pain controlled.  Denies CP, SOB, N/V, numbness/tingling     Vital Signs Last 24 Hrs  T(C): --  T(F): --  HR: --  BP: --  BP(mean): --  RR: --  SpO2: --  I&O's Summary      Physical Exam:  General: Pt Alert and oriented, NAD  Dsg C/D/I  HV x1  MSK:   Skin warm and well perfused.   PT pulses palpable bilateral lower extremities.   Sensation intact to bilateral lower extremities, diminished to right lower extremity compared to left lower extremity.   EHL/TA/GS/FHL 5/5 bilateral lower extremities.                          17.5   7.70  )-----------( 225      ( 15 Dec 2022 12:19 )             51.6     12-15    136  |  99  |  16  ----------------------------<  101<H>  4.3   |  28  |  1.17    Ca    9.6      15 Dec 2022 12:19    TPro  7.3  /  Alb  4.2  /  TBili  0.5  /  DBili  x   /  AST  36  /  ALT  45  /  AlkPhos  68  12-15    57M s/p L3-4 laminectomy, L5-S1 extraforaminal decompression with Dr. Rodriguez on 12/16  - Stable  - Pain control  - WBAT  - SCDs  - Monitor drain output  - Pending PT      Ortho Pager [phone]
Ortho Note    Pt seen and examined. Pain controlled. Reports BLE numbness/ "sciatica", R>L  Denies CP, SOB, N/V. Aware of plan for OR today.    Vital Signs Last 24 Hrs  T(C): 36.8 (12-16-22 @ 09:14), Max: 36.8 (12-16-22 @ 09:14)  T(F): 98.3 (12-16-22 @ 09:14), Max: 98.3 (12-16-22 @ 09:14)  HR: 71 (12-16-22 @ 09:14) (71 - 76)  BP: 132/76 (12-16-22 @ 09:14) (132/76 - 141/83)  BP(mean): --  RR: 17 (12-16-22 @ 09:14) (16 - 17)  SpO2: 95% (12-16-22 @ 09:14) (94% - 95%)  AVSS    General: Pt Alert and oriented, NAD  Pulses: +DP, WWP feet  Sensation: SILT diminished RLE compared to L  Motor: 5/5 EHL/FHL/GS RLE, 4/5 TA RLE; 5/5 EHL/FHL/TA/GS LLE; quad and hamstring strength BLE 5/5 in bed                          16.9   8.56  )-----------( 219      ( 16 Dec 2022 06:32 )             49.7     12-16    136  |  100  |  19  ----------------------------<  108<H>  3.9   |  25  |  1.16    Ca    9.2      16 Dec 2022 06:32    TPro  7.3  /  Alb  4.2  /  TBili  0.5  /  DBili  x   /  AST  36  /  ALT  45  /  AlkPhos  68  12-15      A/P: 57yMale pre-op for L5-S1 decompression today    - medical consult for pre-op clearance and optimization- cleared yesterday  - Pain Control  - DVT ppx: SCDs  - PT, WBS: WBAT  - NPO/IVF  - dispo: OR with Dr. Rodriguez today    Ortho Pager [phone]
INTERVAL HPI/OVERNIGHT EVENTS: MEAGAN O/N    SUBJECTIVE: Patient seen and examined at bedside.     Reports increased back pain this morning upon waking up but improved after receiving pain medications  No numbness. +flatus but no BM yet. Ambulating wo LH/Dizziness.    OBJECTIVE:    VITAL SIGNS:  ICU Vital Signs Last 24 Hrs  T(C): 37.1 (18 Dec 2022 09:53), Max: 37.2 (17 Dec 2022 16:16)  T(F): 98.8 (18 Dec 2022 09:53), Max: 98.9 (17 Dec 2022 16:16)  HR: 91 (18 Dec 2022 09:53) (75 - 91)  BP: 144/81 (18 Dec 2022 09:53) (135/68 - 159/84)  BP(mean): --  ABP: --  ABP(mean): --  RR: 16 (18 Dec 2022 09:53) (16 - 18)  SpO2: 96% (18 Dec 2022 09:53) (95% - 97%)    O2 Parameters below as of 18 Dec 2022 09:53  Patient On (Oxygen Delivery Method): room air              12-17 @ 07:01  -  12-18 @ 07:00  --------------------------------------------------------  IN: 920 mL / OUT: 1050 mL / NET: -130 mL      CAPILLARY BLOOD GLUCOSE          PHYSICAL EXAM:  GEN: Male in NAD on RA  HEENT: NC/AT, MMM  CV: RRR, nml S1S2, no murmurs  PULM: nml effort, CTAB  ABD: Soft, non-distended, NABS, non-tender  NEURO  A/O x3, moving all extremities, Sensation intact  5/5 in b/l hip, knee and plantarflex/ext  PSYCH: Appropriate    MEDICATIONS:  MEDICATIONS  (STANDING):  acetaminophen     Tablet .. 975 milliGRAM(s) Oral every 8 hours  cyclobenzaprine 10 milliGRAM(s) Oral every 8 hours  influenza   Vaccine 0.5 milliLiter(s) IntraMuscular once  lactated ringers. 1000 milliLiter(s) (120 mL/Hr) IV Continuous <Continuous>  lactated ringers. 1000 milliLiter(s) (120 mL/Hr) IV Continuous <Continuous>  polyethylene glycol 3350 17 Gram(s) Oral daily  senna 2 Tablet(s) Oral at bedtime    MEDICATIONS  (PRN):  bisacodyl 5 milliGRAM(s) Oral every 12 hours PRN Constipation  HYDROmorphone   Tablet 2 milliGRAM(s) Oral every 4 hours PRN Moderate Pain (4 - 6)  HYDROmorphone   Tablet 4 milliGRAM(s) Oral every 4 hours PRN Severe Pain (7 - 10)  HYDROmorphone  Injectable 0.5 milliGRAM(s) IV Push every 4 hours PRN breakthrough pain  melatonin 5 milliGRAM(s) Oral at bedtime PRN Sleep  ondansetron Injectable 4 milliGRAM(s) IV Push every 8 hours PRN Nausea and/or Vomiting      ALLERGIES:  Allergies    No Known Allergies    Intolerances        LABS:                        14.7   12.04 )-----------( 210      ( 18 Dec 2022 05:30 )             44.4     12-18    136  |  100  |  15  ----------------------------<  94  4.2   |  27  |  1.07    Ca    8.7      18 Dec 2022 05:30            RADIOLOGY & ADDITIONAL TESTS: Reviewed.
INTERVAL HPI/OVERNIGHT EVENTS: MEAGAN O/N    SUBJECTIVE: Patient seen and examined at bedside.     pain well controlled ,has questions about wound care and dressing changes     OBJECTIVE:    Vital Signs Last 24 Hrs  T(C): 36.7 (19 Dec 2022 09:04), Max: 36.9 (18 Dec 2022 15:14)  T(F): 98 (19 Dec 2022 09:04), Max: 98.4 (18 Dec 2022 15:14)  HR: 83 (19 Dec 2022 09:04) (73 - 89)  BP: 133/81 (19 Dec 2022 09:04) (107/71 - 152/96)  BP(mean): --  RR: 14 (19 Dec 2022 09:04) (14 - 18)  SpO2: 97% (19 Dec 2022 09:04) (92% - 97%)    Parameters below as of 19 Dec 2022 09:04  Patient On (Oxygen Delivery Method): room air                      PHYSICAL EXAM:  GEN: Male in NAD on RA  HEENT: NC/AT, MMM  CV: RRR, nml S1S2, no murmurs  PULM: nml effort, CTAB  ABD: Soft, non-distended, NABS, non-tender  NEURO  A/O x3, moving all extremities, Sensation intact  5/5 in b/l hip, knee and plantarflex/ext  PSYCH: Appropriate        ALLERGIES:  Allergies    No Known Allergies    Intolerances        LABS:                                   14.7   12.04 )-----------( 210      ( 18 Dec 2022 05:30 )             44.4   12-18    136  |  100  |  15  ----------------------------<  94  4.2   |  27  |  1.07    Ca    8.7      18 Dec 2022 05:30              RADIOLOGY & ADDITIONAL TESTS: Reviewed.
Ortho Note    Pt seen and examined on morning rounds. Pt comfortable without complaints, pain controlled.  Denies CP, SOB, N/V, numbness/tingling     Vital Signs Last 24 Hrs  T(C): --  T(F): --  HR: --  BP: --  BP(mean): --  RR: --  SpO2: --  I&O's Summary      Physical Exam:  General: Pt Alert and oriented, NAD  MSK:   Skin warm and well perfused.   PT pulses palpable bilateral lower extremities.   Sensation intact to bilateral lower extremities, diminished to right lower extremity compared to left lower extremity.   EHL/TA/GS/FHL 5/5 bilateral lower extremities.                          17.5   7.70  )-----------( 225      ( 15 Dec 2022 12:19 )             51.6     12-15    136  |  99  |  16  ----------------------------<  101<H>  4.3   |  28  |  1.17    Ca    9.6      15 Dec 2022 12:19    TPro  7.3  /  Alb  4.2  /  TBili  0.5  /  DBili  x   /  AST  36  /  ALT  45  /  AlkPhos  68  12-15    57M with low back pain and radicullopathy  OR today for L3-4 laminectomy, L5-S1 extraforaminal decompression.  -Dr. De Leon consulted for medical co-management/pre-op optimization for OR tomorrow.  -Preop labs/EKG pending.   -Pain control.   -NPO/IVF     Mikey Saul, PGY-2  Ortho Pager [phone]
Ortho Note    Pt seen and examined on morning rounds. Pt comfortable without complaints, pain controlled.  Denies CP, SOB, N/V, numbness/tingling     Vital Signs Last 24 Hrs  T(C): 36.9 (18 Dec 2022 06:05), Max: 37.2 (17 Dec 2022 16:16)  T(F): 98.5 (18 Dec 2022 06:05), Max: 98.9 (17 Dec 2022 16:16)  HR: 83 (18 Dec 2022 06:05) (74 - 83)  BP: 149/80 (18 Dec 2022 06:05) (134/67 - 159/84)  BP(mean): --  RR: 16 (18 Dec 2022 06:05) (16 - 18)  SpO2: 95% (18 Dec 2022 06:05) (95% - 97%)    Parameters below as of 18 Dec 2022 06:05  Patient On (Oxygen Delivery Method): room air      Physical Exam:  General: Pt Alert and oriented, NAD  Dsg C/D/I  MSK:   Skin warm and well perfused.   PT pulses palpable bilateral lower extremities.   Sensation intact to bilateral lower extremities, diminished to right lower extremity compared to left lower extremity.   EHL/TA/GS/FHL 5/5 bilateral lower extremities.                    LABS:                          14.7   12.04 )-----------( 210      ( 18 Dec 2022 05:30 )             44.4     12-18    136  |  100  |  15  ----------------------------<  94  4.2   |  27  |  1.07    Ca    8.7      18 Dec 2022 05:30              57M s/p L3-4 laminectomy, L5-S1 extraforaminal decompression with Dr. Rodriguez on 12/16  - Stable  - Pain control  - WBAT  - SCDs  - Ortho Stable for D/c    Ortho Pager [phone]
Ortho Post Op Check    Procedure: L5-S1 decompression  Surgeon: Jennifer    Pt reports moderate pain.  Denies CP, SOB, N/V. Denies new numbness or tingling but reports continued numbness in the right thigh     Vital Signs Last 24 Hrs  T(C): 36.6 (12-16-22 @ 13:52), Max: 36.8 (12-16-22 @ 09:14)  T(F): 97.9 (12-16-22 @ 13:52), Max: 98.3 (12-16-22 @ 09:14)  HR: 76 (12-16-22 @ 14:07) (71 - 82)  BP: 146/79 (12-16-22 @ 14:07) (122/60 - 146/79)  BP(mean): 97 (12-16-22 @ 14:07) (85 - 97)  RR: 18 (12-16-22 @ 14:07) (16 - 18)  SpO2: 95% (12-16-22 @ 14:07) (95% - 97%)  I&O's Summary      General: Pt Alert and oriented, NAD  DSG C/D/I: HVx1; gauze/tape  Pulses: DP2+ bilat LE  Sensation:  General sensation to light touch intact bilat LE  Motor:   BIlat hip flexion intact  EHL R 4/5; L 5/5  FHL/TA/GS 5/5 bilat LE                          16.9   8.56  )-----------( 219      ( 16 Dec 2022 06:32 )             49.7     12-16    136  |  100  |  19  ----------------------------<  108<H>  3.9   |  25  |  1.16    Ca    9.2      16 Dec 2022 06:32    TPro  7.3  /  Alb  4.2  /  TBili  0.5  /  DBili  x   /  AST  36  /  ALT  45  /  AlkPhos  68  12-15      Intraop fluoroscopy confirms levels    A/P: 57yMale s/p L5-S1 decompression, 12/16/22, Dr. Rodriguez  - Stable  - Pain Control  - DVT ppx: SCDs  - Post op abx: Ancef  - PT, WBS: WBAT  - Monitor     Ortho Pager [phone]
INTERVAL HPI/OVERNIGHT EVENTS: MEAGAN O/N    SUBJECTIVE: Patient seen and examined at bedside.   Pt reports pain around incision- was able to ambulate today wo LH/Dizziness, chest pain, dyspnea  Baseline sxs - R leg numnbess, pain radiating down legs and L foot numbness - Radiating pain now has resolved. Numbness has significantly improved. +flatus no BM. Voiding. No fever, chills.    OBJECTIVE:    VITAL SIGNS:  ICU Vital Signs Last 24 Hrs  T(C): 36.8 (17 Dec 2022 20:35), Max: 37.2 (17 Dec 2022 16:16)  T(F): 98.2 (17 Dec 2022 20:35), Max: 98.9 (17 Dec 2022 16:16)  HR: 80 (17 Dec 2022 20:35) (71 - 80)  BP: 159/84 (17 Dec 2022 20:35) (117/64 - 159/84)  BP(mean): --  ABP: --  ABP(mean): --  RR: 18 (17 Dec 2022 20:35) (16 - 18)  SpO2: 97% (17 Dec 2022 20:35) (94% - 97%)    O2 Parameters below as of 17 Dec 2022 20:35  Patient On (Oxygen Delivery Method): room air              12-16 @ 07:01  -  12-17 @ 07:00  --------------------------------------------------------  IN: 1240 mL / OUT: 222.5 mL / NET: 1017.5 mL    12-17 @ 07:01  -  12-18 @ 00:23  --------------------------------------------------------  IN: 920 mL / OUT: 1050 mL / NET: -130 mL      CAPILLARY BLOOD GLUCOSE          PHYSICAL EXAM:  GEN: Male in NAD on RA  HEENT: NC/AT, MMM  CV: RRR, nml S1S2, no murmurs  PULM: nml effort, CTAB  ABD: Soft, non-distended, NABS, non-tender  NEURO  A/O x3, moving all extremities, Sensation intact  5/5 in b/l hip, knee and plantarflex/ext  PSYCH: Appropriate      MEDICATIONS:  MEDICATIONS  (STANDING):  acetaminophen     Tablet .. 975 milliGRAM(s) Oral every 8 hours  cyclobenzaprine 10 milliGRAM(s) Oral every 8 hours  influenza   Vaccine 0.5 milliLiter(s) IntraMuscular once  lactated ringers. 1000 milliLiter(s) (120 mL/Hr) IV Continuous <Continuous>  lactated ringers. 1000 milliLiter(s) (120 mL/Hr) IV Continuous <Continuous>  polyethylene glycol 3350 17 Gram(s) Oral daily  senna 2 Tablet(s) Oral at bedtime    MEDICATIONS  (PRN):  bisacodyl 5 milliGRAM(s) Oral every 12 hours PRN Constipation  HYDROmorphone   Tablet 2 milliGRAM(s) Oral every 4 hours PRN Moderate Pain (4 - 6)  HYDROmorphone   Tablet 4 milliGRAM(s) Oral every 4 hours PRN Severe Pain (7 - 10)  HYDROmorphone  Injectable 0.5 milliGRAM(s) IV Push every 4 hours PRN breakthrough pain  melatonin 5 milliGRAM(s) Oral at bedtime PRN Sleep  ondansetron Injectable 4 milliGRAM(s) IV Push every 8 hours PRN Nausea and/or Vomiting      ALLERGIES:  Allergies    No Known Allergies    Intolerances        LABS:                        14.3   13.61 )-----------( 205      ( 17 Dec 2022 05:30 )             42.4     12-17    135  |  98  |  20  ----------------------------<  180<H>  4.1   |  27  |  1.27    Ca    8.8      17 Dec 2022 05:30            RADIOLOGY & ADDITIONAL TESTS: Reviewed.
Patient is a 57y old  Male who presents with a chief complaint of Low back pain with radiculopathy (15 Dec 2022 15:46)        SUBJECTIVE:  Patient was seen and examined at bedside.    Overnight Events :  resting in chair , no complaints except for back pain       Review of systems: 12 point Review of systems negative unless otherwise documented elsewhere in note.     Diet, Regular (12-15-22 @ 13:01) [Active]  Diet, NPO after Midnight:      NPO Start Date: 15-Dec-2022,   NPO Start Time: 23:59 (12-15-22 @ 13:01) [Active]      MEDICATIONS:  MEDICATIONS  (STANDING):  acetaminophen     Tablet .. 975 milliGRAM(s) Oral every 8 hours  lactated ringers. 1000 milliLiter(s) (120 mL/Hr) IV Continuous <Continuous>  senna 2 Tablet(s) Oral at bedtime    MEDICATIONS  (PRN):  bisacodyl 5 milliGRAM(s) Oral every 12 hours PRN Constipation  HYDROmorphone  Injectable 0.5 milliGRAM(s) IV Push every 4 hours PRN breakthrough pain  melatonin 5 milliGRAM(s) Oral at bedtime PRN Sleep  ondansetron Injectable 4 milliGRAM(s) IV Push every 8 hours PRN Nausea and/or Vomiting  oxyCODONE    IR 10 milliGRAM(s) Oral every 4 hours PRN Severe Pain (7 - 10)  oxyCODONE    IR 5 milliGRAM(s) Oral every 4 hours PRN Moderate Pain (4 - 6)      Allergies    No Known Allergies    Intolerances        OBJECTIVE:  Vital Signs Last 24 Hrs  T(C): 36.8 (16 Dec 2022 10:28), Max: 36.9 (15 Dec 2022 17:01)  T(F): 98.3 (16 Dec 2022 09:14), Max: 98.4 (15 Dec 2022 17:01)  HR: 71 (16 Dec 2022 10:28) (71 - 92)  BP: 132/76 (16 Dec 2022 10:28) (122/76 - 159/80)  BP(mean): --  RR: 17 (16 Dec 2022 10:28) (16 - 18)  SpO2: 95% (16 Dec 2022 10:28) (94% - 98%)    Parameters below as of 16 Dec 2022 09:14  Patient On (Oxygen Delivery Method): room air      I&O's Summary      PHYSICAL EXAM:  Gen: Resting in bed at time of exam, not in distress   HEENT: moist mucosa, no lesions   Neck: supple, trachea at midline  CV: RRR, +S1/S2  Pulm: no wheezing , no crackles  no increase in work of breathing  Abd: soft, NTND  Skin: warm and dry, no new rashes   Ext: moving all 4 extremities spontaneously , no edema  ,  Neuro: AOx3, decreased sensation right lower extremity   Psych: affect and behavior appropriate, pleasant at time of interview    LABS:                        16.9   8.56  )-----------( 219      ( 16 Dec 2022 06:32 )             49.7     12-16    136  |  100  |  19  ----------------------------<  108<H>  3.9   |  25  |  1.16    Ca    9.2      16 Dec 2022 06:32    TPro  7.3  /  Alb  4.2  /  TBili  0.5  /  DBili  x   /  AST  36  /  ALT  45  /  AlkPhos  68  12-15    LIVER FUNCTIONS - ( 15 Dec 2022 12:19 )  Alb: 4.2 g/dL / Pro: 7.3 g/dL / ALK PHOS: 68 U/L / ALT: 45 U/L / AST: 36 U/L / GGT: x           PT/INR - ( 15 Dec 2022 12:19 )   PT: 12.0 sec;   INR: 1.01          PTT - ( 15 Dec 2022 12:19 )  PTT:36.6 sec  CAPILLARY BLOOD GLUCOSE            MICRODATA:      RADIOLOGY/OTHER STUDIES:

## 2022-12-19 NOTE — DISCHARGE NOTE NURSING/CASE MANAGEMENT/SOCIAL WORK - NSDCPEFALRISK_GEN_ALL_CORE
For information on Fall & Injury Prevention, visit: [email or web address] OR  [email or web address] OR  https://www.cdc.gov/steadi/patient.html

## 2022-12-19 NOTE — PROGRESS NOTE ADULT - PROVIDER SPECIALTY LIST ADULT
Hospitalist
Internal Medicine
Orthopedics
Hospitalist
Orthopedics
Hospitalist

## 2022-12-19 NOTE — PROGRESS NOTE ADULT - ASSESSMENT
57M w anxiety on buproprion admitted for back pain – lumbar radiculopathy s/p L5-S1 Lami/decomp w Dr. Rodriguez 12/16       #Lumbar radiculopathy - pain improved  #leukocytosis - Imiproving 12 from 13. likely reactive from OR. Nontoxic appearing    Plan  Overall doing well   Can restart buproprion 100mg daily - pt reports home regimen      DISPO: Home 
57M w anxiety on buproprion admitted for back pain – lumbar radiculopathy s/p L5-S1 Lami/decomp w Dr. Rodriguez 12/16     #Post-op state - pain controlled. PPx: SCDs. On bowel regimen and incentive spirometer  #Lumbar radiculopathy - pain improved  #leukocytosis - likely reactive 13.6 from 8. Nontoxic appearing    Plan  Overall doing well - if remains inpatient can obtain CBC w diff in AM  Can restart buproprion 100mg daily - pt reports home regimen  COunseled on smoking cessation (smokes cigars occasionally    DISPO: Home no needs - has 1 flight of stairs
57M w anxiety on buproprion admitted for back pain – lumbar radiculopathy s/p L5-S1 Lami/decomp w Dr. Rodriguez 12/16     #Post-op state - pain controlled. PPx: SCDs. On bowel regimen and incentive spirometer  #Lumbar radiculopathy - pain improved  #leukocytosis - Imiproving 12 from 13. likely reactive from OR. Nontoxic appearing    Plan  Overall doing well   Can restart buproprion 100mg daily - pt reports home regimen  Counseled on smoking cessation (smokes cigars occasionally    DISPO: Home no needs - has 1 flight of stairs
57 year old male admitted to the hospital with back pain and radiculopathy     Impression and Plan   # Pre op Risk eval   - RCRI Class 1 , 0.4% risk of periop mace within 30 days   - Low risk of Pulm Complications   - No hx of bleeding   - EKG Sinus rhythm , no ST-T changes ,   - Medically optimized for Laminectomy with above specified risk     # Anxiety   - Continue Bupropion ,    # DVT Prophylaxis

## 2022-12-19 NOTE — DISCHARGE NOTE NURSING/CASE MANAGEMENT/SOCIAL WORK - PATIENT PORTAL LINK FT
You can access the FollowMyHealth Patient Portal offered by [facility] by registering at the following website: [email or web address]. By joining [employer]’s FollowMyHealth portal, you will also be able to view your health information using other applications (apps) compatible with our system.

## 2022-12-21 ENCOUNTER — EMERGENCY (EMERGENCY)
Facility: HOSPITAL | Age: 57
LOS: 1 days | Discharge: HOME | End: 2022-12-21
Attending: EMERGENCY MEDICINE | Admitting: EMERGENCY MEDICINE
Payer: OTHER MISCELLANEOUS

## 2022-12-21 VITALS
SYSTOLIC BLOOD PRESSURE: 137 MMHG | HEART RATE: 95 BPM | TEMPERATURE: 98 F | RESPIRATION RATE: 19 BRPM | DIASTOLIC BLOOD PRESSURE: 87 MMHG | OXYGEN SATURATION: 95 %

## 2022-12-21 VITALS
DIASTOLIC BLOOD PRESSURE: 89 MMHG | WEIGHT: 197.98 LBS | SYSTOLIC BLOOD PRESSURE: 136 MMHG | RESPIRATION RATE: 18 BRPM | HEART RATE: 100 BPM | TEMPERATURE: 98 F | HEIGHT: 68.5 IN | OXYGEN SATURATION: 96 %

## 2022-12-21 DIAGNOSIS — Z98.890 OTHER SPECIFIED POSTPROCEDURAL STATES: ICD-10-CM

## 2022-12-21 DIAGNOSIS — M62.838 OTHER MUSCLE SPASM: ICD-10-CM

## 2022-12-21 DIAGNOSIS — F41.9 ANXIETY DISORDER, UNSPECIFIED: ICD-10-CM

## 2022-12-21 DIAGNOSIS — Z87.891 PERSONAL HISTORY OF NICOTINE DEPENDENCE: ICD-10-CM

## 2022-12-21 DIAGNOSIS — Z20.822 CONTACT WITH AND (SUSPECTED) EXPOSURE TO COVID-19: ICD-10-CM

## 2022-12-21 LAB
ALBUMIN SERPL ELPH-MCNC: 3.9 G/DL — SIGNIFICANT CHANGE UP (ref 3.3–5)
ALP SERPL-CCNC: 63 U/L — SIGNIFICANT CHANGE UP (ref 40–120)
ALT FLD-CCNC: 27 U/L — SIGNIFICANT CHANGE UP (ref 10–45)
ANION GAP SERPL CALC-SCNC: 11 MMOL/L — SIGNIFICANT CHANGE UP (ref 5–17)
APTT BLD: 30.7 SEC — SIGNIFICANT CHANGE UP (ref 27.5–35.5)
AST SERPL-CCNC: 26 U/L — SIGNIFICANT CHANGE UP (ref 10–40)
BASOPHILS # BLD AUTO: 0.03 K/UL — SIGNIFICANT CHANGE UP (ref 0–0.2)
BASOPHILS NFR BLD AUTO: 0.3 % — SIGNIFICANT CHANGE UP (ref 0–2)
BILIRUB SERPL-MCNC: 0.5 MG/DL — SIGNIFICANT CHANGE UP (ref 0.2–1.2)
BUN SERPL-MCNC: 19 MG/DL — SIGNIFICANT CHANGE UP (ref 7–23)
CALCIUM SERPL-MCNC: 9.8 MG/DL — SIGNIFICANT CHANGE UP (ref 8.4–10.5)
CHLORIDE SERPL-SCNC: 94 MMOL/L — LOW (ref 96–108)
CO2 SERPL-SCNC: 29 MMOL/L — SIGNIFICANT CHANGE UP (ref 22–31)
CREAT SERPL-MCNC: 1.04 MG/DL — SIGNIFICANT CHANGE UP (ref 0.5–1.3)
EGFR: 83 ML/MIN/1.73M2 — SIGNIFICANT CHANGE UP
EOSINOPHIL # BLD AUTO: 0.17 K/UL — SIGNIFICANT CHANGE UP (ref 0–0.5)
EOSINOPHIL NFR BLD AUTO: 1.9 % — SIGNIFICANT CHANGE UP (ref 0–6)
GLUCOSE SERPL-MCNC: 95 MG/DL — SIGNIFICANT CHANGE UP (ref 70–99)
HCT VFR BLD CALC: 45.2 % — SIGNIFICANT CHANGE UP (ref 39–50)
HGB BLD-MCNC: 15.2 G/DL — SIGNIFICANT CHANGE UP (ref 13–17)
IMM GRANULOCYTES NFR BLD AUTO: 0.9 % — SIGNIFICANT CHANGE UP (ref 0–0.9)
INR BLD: 1.04 — SIGNIFICANT CHANGE UP (ref 0.88–1.16)
LYMPHOCYTES # BLD AUTO: 1.26 K/UL — SIGNIFICANT CHANGE UP (ref 1–3.3)
LYMPHOCYTES # BLD AUTO: 13.9 % — SIGNIFICANT CHANGE UP (ref 13–44)
MCHC RBC-ENTMCNC: 29.4 PG — SIGNIFICANT CHANGE UP (ref 27–34)
MCHC RBC-ENTMCNC: 33.6 GM/DL — SIGNIFICANT CHANGE UP (ref 32–36)
MCV RBC AUTO: 87.4 FL — SIGNIFICANT CHANGE UP (ref 80–100)
MONOCYTES # BLD AUTO: 0.97 K/UL — HIGH (ref 0–0.9)
MONOCYTES NFR BLD AUTO: 10.7 % — SIGNIFICANT CHANGE UP (ref 2–14)
NEUTROPHILS # BLD AUTO: 6.53 K/UL — SIGNIFICANT CHANGE UP (ref 1.8–7.4)
NEUTROPHILS NFR BLD AUTO: 72.3 % — SIGNIFICANT CHANGE UP (ref 43–77)
NRBC # BLD: 0 /100 WBCS — SIGNIFICANT CHANGE UP (ref 0–0)
PLATELET # BLD AUTO: 276 K/UL — SIGNIFICANT CHANGE UP (ref 150–400)
POTASSIUM SERPL-MCNC: 4.2 MMOL/L — SIGNIFICANT CHANGE UP (ref 3.5–5.3)
POTASSIUM SERPL-SCNC: 4.2 MMOL/L — SIGNIFICANT CHANGE UP (ref 3.5–5.3)
PROT SERPL-MCNC: 6.9 G/DL — SIGNIFICANT CHANGE UP (ref 6–8.3)
PROTHROM AB SERPL-ACNC: 12.4 SEC — SIGNIFICANT CHANGE UP (ref 10.5–13.4)
RBC # BLD: 5.17 M/UL — SIGNIFICANT CHANGE UP (ref 4.2–5.8)
RBC # FLD: 12.3 % — SIGNIFICANT CHANGE UP (ref 10.3–14.5)
SARS-COV-2 RNA SPEC QL NAA+PROBE: NEGATIVE — SIGNIFICANT CHANGE UP
SODIUM SERPL-SCNC: 134 MMOL/L — LOW (ref 135–145)
WBC # BLD: 9.04 K/UL — SIGNIFICANT CHANGE UP (ref 3.8–10.5)
WBC # FLD AUTO: 9.04 K/UL — SIGNIFICANT CHANGE UP (ref 3.8–10.5)

## 2022-12-21 PROCEDURE — 85610 PROTHROMBIN TIME: CPT

## 2022-12-21 PROCEDURE — 87635 SARS-COV-2 COVID-19 AMP PRB: CPT

## 2022-12-21 PROCEDURE — 36415 COLL VENOUS BLD VENIPUNCTURE: CPT

## 2022-12-21 PROCEDURE — 99284 EMERGENCY DEPT VISIT MOD MDM: CPT

## 2022-12-21 PROCEDURE — 85730 THROMBOPLASTIN TIME PARTIAL: CPT

## 2022-12-21 PROCEDURE — 80053 COMPREHEN METABOLIC PANEL: CPT

## 2022-12-21 PROCEDURE — 85025 COMPLETE CBC W/AUTO DIFF WBC: CPT

## 2022-12-21 PROCEDURE — 99283 EMERGENCY DEPT VISIT LOW MDM: CPT

## 2022-12-21 PROCEDURE — 96374 THER/PROPH/DIAG INJ IV PUSH: CPT

## 2022-12-21 PROCEDURE — 99281 EMR DPT VST MAYX REQ PHY/QHP: CPT

## 2022-12-21 RX ORDER — HYDROMORPHONE HYDROCHLORIDE 2 MG/ML
1 INJECTION INTRAMUSCULAR; INTRAVENOUS; SUBCUTANEOUS ONCE
Refills: 0 | Status: DISCONTINUED | OUTPATIENT
Start: 2022-12-21 | End: 2022-12-21

## 2022-12-21 RX ADMIN — HYDROMORPHONE HYDROCHLORIDE 1 MILLIGRAM(S): 2 INJECTION INTRAMUSCULAR; INTRAVENOUS; SUBCUTANEOUS at 12:55

## 2022-12-21 NOTE — ED PROVIDER NOTE - CARE PROVIDER_API CALL
Alex Rodriguez)  Orthopaedic Surgery  [address] [place] [address]  Phone: [phone]  Fax: [phone]  Follow Up Time:

## 2022-12-21 NOTE — ED ADULT TRIAGE NOTE - OTHER COMPLAINTS
patient c/o back pain s/p back surgery on Friday-- reports spasms, denies CP/SOB/fevers-- denies urinary/bowel incontinence

## 2022-12-21 NOTE — ED PROVIDER NOTE - PATIENT PORTAL LINK FT
You can access the FollowMyHealth Patient Portal offered by [facility] by registering at the following website: [email or web address] By joining [employer]’s FollowMyHealth portal, you will also be able to view your health information using other applications (apps) compatible with our system.

## 2022-12-21 NOTE — ED PROVIDER NOTE - OBJECTIVE STATEMENT
57M w anxiety on buproprion, recently admitted to [facility] for back pain, s/p lumbar radiculopathy s/p L5-S1 Lami/decomp w Dr. Rodriguez 12/16, 1 day ago, who p/w back spasms. 57M w anxiety on buproprion, recently admitted to [facility] for back pain, s/p lumbar radiculopathy s/p L5-S1 Lami/decomp w Dr. Rodriguez 12/16, 1 day ago, who p/w back spasms and back pain since last night. Pt was sitting at his computer and leaned back a little and felt a spasm in his mid right back. He did th same thing this morning and states the pain is unbearable despite taking his flexeril and PO Dilaudid this morning. No f/c, no sp/sob, no n/t/w in ext, no bowel or bladder dysfunction.

## 2022-12-21 NOTE — ED PROVIDER NOTE - NSFOLLOWUPINSTRUCTIONS_ED_ALL_ED_FT
Take pain meds as scheduled and take flexeril 3 times per day.   Take stool softeners to prevent constipations (miralax, colace)    Care Providers for Follow up (PCP/Outpatient Provider)	Alex Rodriguez)  Orthopaedic Surgery  [address] [place] [address]  Phone: [phone]  Fax: [phone]  Follow Up Time: 2 weeks  Discharge Diet	Regular Diet - No restrictions  Activity	Do not drive or operate machinery, No heavy lifting/straining, Showering allowed, Walking - Indoors allowed, Follow Instructions Provided by your Surgical Team  Additional Instructions	No strenuous activity (bending/twisting), heavy lifting, driving or returning to work until cleared by MD.  Keep dressing clean and dry, remove in 72 hours and leave open to air. Leave steri strips in place, do not pick them off.   You may shower, keep incision clean and dry.   Try to have regular bowel movements, take stool softener or laxative if necessary.    May apply ice to affected areas to decrease swelling.  Call to schedule an appt with Dr. Rodriguez for follow up, if you have staples or sutures they will be removed in office.  Contact your doctor if you experience: fever greater than 101.5, chills, chest pain, difficulty breathing, redness or excessive drainage around the incision, other concerns.  Follow up with your primary care provider.

## 2022-12-21 NOTE — ED ADULT NURSE NOTE - OBJECTIVE STATEMENT
57 year old M patient with c/o of back pain s/p laminectomy on friday.  Deneis worsening numbness or wekaness to legs or arms.  No distress noted.  A+OX3, ambulatory w a cane.

## 2022-12-21 NOTE — ED PROVIDER NOTE - IV ALTEPLASE INCLUSION HIDDEN
Called Dr Guillaume Sheets to apprise him of R lateral wound drainage. Per instructions, replaced and reinforced dressing with sterile 4x4\"s and 6 in tegaderm. Dressings sent home with pt. No active bleeding noted, only slight seepage pale pink drainage at steristrip site.
show

## 2022-12-21 NOTE — ED PROVIDER NOTE - CLINICAL SUMMARY MEDICAL DECISION MAKING FREE TEXT BOX
57M w anxiety on buproprion, recently admitted to [facility] for back pain, s/p lumbar radiculopathy s/p L5-S1 Lami/decomp w Dr. Rodriguez 12/16, 1 day ago, who p/w back spasms and back pain since last night. Pt was sitting at his computer and leaned back a little and felt a spasm in his mid right back. He did th same thing this morning and states the pain is unbearable despite taking his flexeril and PO Dilaudid this morning. No f/c, no sp/sob, no n/t/w in ext, no bowel or bladder dysfunction.  VSS, no focal neuro deficits, no red flags for CE.   Plan for pain control, ortho consult.   Labs with no emergent findings.  pt feels better after meds.   Seen by ortho and OK for Dc. Pt told to take pain meds as scheduled and take flexeril 3 times per day.   Pt feeling improved and is stable for DC. ED evaluation and management discussed with the patient in detail.  Close PMD follow up encouraged.  Strict ED return instructions discussed in detail and patient given the opportunity to ask any questions about their discharge diagnosis and instructions. Patient verbalized understanding.

## 2022-12-21 NOTE — ED ADULT TRIAGE NOTE - HISTORY OF COVID-19 VACCINATION
NICU Progress Note  Subjective: This is a  male infant born 2019 10:46 AM at Gestational Age: 34w6d now at age: 8 Days    Weaned to 1L NC this Am, 21%.  Tolerating feeds orally. Off IV fluids. Tachycardia from yesterday essentially resolved.      Objective: Last Apnea:   and intervention:       Vital Last Value 24 Hour Range   Temperature 98.2 °F (36.8 °C) (08/15/17 0800) Temp  Min: 98.2 °F (36.8 °C)  Max: 99.9 °F (37.7 °C)   Pulse (!) 170 (08/15/17 08) Pulse  Min: 144  Max: 200   Respiratory 56 (08/15/17 0800) Resp  Min: 25  Max: 80   Non-Invasive  Blood Pressure 59/40 (08/15/17 08) BP  Min: 59/40  Max: 71/40   Arterial   Blood Pressure   No Data Recorded   O2 Sat 1 L/min (08/15/17 0800) NA   Pulse Oximetry 98 % (08/15/17 0946) SpO2  Min: 95 %  Max: 100 %     Vital Today Admitted   Weight (!) 2178 g (17) Weight: (!) 2225 g (Filed from Delivery Summary) (17 1046)   Weight over the past 48 Hours:   Patient Vitals for the past 48 hrs:   Weight   17 (!) 2181 g   17 (!) 2178 g     Fluids:    Date 17 07 - 08/15/17 0659 08/15/17 0700 - 17 0659   Shift 9360-9021 8777-9340 4544-6250 24 Hour Total 6566-5363 5505-3855 7880-7927 24 Hour Total   I  N  T  A  K  E   P.O.  (mL/kg) 5  (2.29) 42  (19.28) 108  (49.58) 155  (71.16) 36  (16.53)   36  (16.53)    NG/GT  (mL/kg) 73  (33.47) 51  (23.41)  124  (56.93)        TPN  (mL/kg) 62.15  (28.5) 33.22  (15.25) 23.18  (10.64) 118.55  (54.43)        Shift Total  (mL/kg) 140.15  (64.26) 126.22  (57.95) 131.18  (60.23) 397.55  (182.52) 36  (16.53)   36  (16.53)   O  U  T  P  U  T   Urine  (mL/kg/hr) 100  (5.73) 98  (5.62) 118  (6.77) 316  (6.05)        Drains 0 5.5 1.5 7          Tube Feeding Residual Discarded (mL) 0 5.5 1.5 7        Shift Total 100 103.5 119.5 323       Weight (kg) 2.18 2.18 2.18 2.18 2.18 2.18 2.18 2.18      Feeds 36ml q3 ~129 ml/kg/d    UOP 6ml/kg/hr  Last Stool: 1 (08/15/17 0800) x5    Physical  Exam:  Birthweight: 4 lb 14.5 oz (2225 g) with Weight change: -3 g  -2% since birth  GEN: asleep, arouses with exam  HEENT: AFSOF  Skin: Mild jaundice, without lesions, bruising to head  Lungs: good aeration bilaterally  CV: RRR with no murmur, 2+ femoral pulses  GI:Abdomen: soft, no masses, non distended  NEURO:  good tone and activity    Labs:   Recent Results (from the past 24 hour(s))   CBC & Auto Differential    Collection Time: 08/14/17  2:49 PM   Result Value Ref Range    WBC 12.7 9.4 - 30.0 K/mcL    RBC 4.08 3.90 - 6.30 mil/mcL    HGB 14.5 13.5 - 21.5 g/dL    HCT 39.8 (L) 42.0 - 66.0 %    MCV 97.5 88.0 - 126.0 fl    MCH 35.5 28.0 - 40.0 pg    MCHC 36.4 29.0 - 37.0 g/dL    RDW-CV 17.4 (H) 11.0 - 15.0 %     140 - 450 K/mcL    DIFF TYPE MANUAL DIFFERENTIAL     SEG 36 %    BAND 1 0 - 10 %    LYMPH 47 %    MONO 13 %    EOS 1 %    BASO 0 %    MYELO 2 (H) 0 %    Absolute Neut 4.7 1.5 - 10.0 K/mcL    Absolute Lymph 6.0 2.0 - 17.0 K/mcL    Absolute Mono 1.7 0.4 - 1.8 K/mcL    Absolute Eos 0.1 0.0 - 0.9 K/mcL    Absolute Baso 0.0 0.0 - 0.6 K/mcL    WBC MORPHOLOGY NORMAL NORMAL    Macrocytosis MODERATE     Polychromasia MODERATE     Large Platelets PRESENT    Metered blood glucose    Collection Time: 08/14/17  4:51 PM   Result Value Ref Range    Glucose Bedside POC 68 54 - 117 mg/dL   Bilirubin Total    Collection Time: 08/15/17  5:00 AM   Result Value Ref Range    TOTAL BILIRUBIN 10.3 (H) 0.2 - 1.4 mg/dL   Blood Gas Capillary - Point of Care    Collection Time: 08/15/17  5:05 AM   Result Value Ref Range    PH Capillary 7.38 7.35 - 7.45 Units    PCO2 Capillary 33 (L) 35 - 48 mm Hg    PO2 Capillary 53 (H) 34 - 45 mm Hg    HCO3 Capillary 19 (L) 22 - 28 mmol/L    Base Deficit Capillary 5 (H) 0 - 2 mmol/L    O2 SAT Capillary 87 (L) >95 %    FIO2 POC 21 %   Chem 8 Panel - Point of Care    Collection Time: 08/15/17  5:08 AM   Result Value Ref Range    Sodium  135 - 145 mmol/L    Potassium POC 5.2 3.5 - 6.0  mmol/L    Chloride  (H) 98 - 107 mmol/L    CALCIUM IONIZED-POC 1.42 (H) 1.15 - 1.29 mmol/L    CO2 Total 19 19 - 24 mmol/L    GLUCOSE POC 64 54 - 117 mg/dL    BUN POC 31 (H) 5 - 19 mg/dL    HEMATOCRIT POC 45.0 42.0 - 66.0 %    Hemoglobin POC 15.3 13.5 - 21.5 g/dL    ANION GAP POC 20 mmol/L    Creatinine POC 0.40 0.16 - 0.59 mg/dL    Estimated GFR  (POC) Not calculated.     Estimated GFR Non- (POC) Not calculated.          Scheduled Meds:   • hepatitis B  0.5 mL Intramuscular Once        Discharge Planning:    State Screen:     Hearing Screen:     Congenital Heart Disease Screen:    Head Ultrasound:   Circumcision:    Immunizations:   Most Recent Immunizations   Administered Date(s) Administered   • None   Pended Date(s) Pended   • Hepatitis B Adult 2017      Eye Exam:   Event Recording:   PMD:     Impression:  male infant born at 34 6/7 weeks now 8 Days with      Patient Active Problem List    Diagnosis Date Noted   • Pneumomediastinum (CMS/HCC) 2017   • Respiratory distress syndrome of  2017   • Prematurity, 2,000-2,499 grams, 33-34 completed weeks 2017   • Pneumothorax, left 2017   Hyperbili, improved on phototherapy    Plan:   CNS: No active issues  CV:  Follow HR, BP, perfusion.    Pulm:  Wean NC off as tolerates.  Gases PRN.  FEN:  Fortify feeds to 22kcal, change to ad angella.  HEME: Stop photo, check bili in AM.  ID:  No current concerns for infection.  Start to work on discharge planning.    Updated mom on phone.  2017 10:29 AM       Yes

## 2022-12-21 NOTE — CONSULT NOTE ADULT - SUBJECTIVE AND OBJECTIVE BOX
Pt Name: GHISLAINE DELAROSA  MRN: [id number]      Pt is a 58yo Malec/o back pain and spasms x 1 day. Pt. states yesterday he was sitting in his chair when he suddenly felt spasms in the "T6" region with sharp radiating back pain. Pt. recently had L3-4 laminectomy, L5-S1 decompression on 12/16/22 with Dr. Blanc and discharged on 12/19/22. Pt. reports he stopped taking as much pain medication once discharged and takes flexeril twice a day. Pt. still has same burning sensation and decreased slt in left le preop, but states after surgery it is not as intense. Pt. is nervous because he lives by himself and wasn't sure what was going on and wanted to come to ED.       ROS is otherwise negative.    PAST MEDICAL & SURGICAL HISTORY:  anxiety      Allergies    No Known Allergies    Medications:    T(C): 36.8 (12-21-22 @ 14:48), Max: 36.8 (12-21-22 @ 10:45)  HR: 95 (12-21-22 @ 14:48) (95 - 100)  BP: 137/87 (12-21-22 @ 14:48) (122/80 - 137/87)  RR: 19 (12-21-22 @ 14:48) (18 - 19)  SpO2: 95% (12-21-22 @ 14:48) (95% - 96%)  Wt(kg): --    Gen: nervous/anxious  Neurological: no focal deficits  Rectal: not tested  Musculoskeletal:  Spine:skin intact, steristrips intact at incision site. No ecchymosis/sts/drainage noted. Mild ttp at lumbar region when incision is.   Lower extremities: skin intact, no erythema/ecchymosis/sts. SLT intact to patients baseline (Right>left), DP/PT 2+,   QUAD/HAM/EHL/FHL/TA/GS 5/5        A/P:  Pt is a 58yo Male s/p L3-4 laminectomy, L5-S1 decompression on 12/16/22   -D/w Dr. Blanc  - Post operatively no deficits. Pt. counseled on properly using pain medications and can take flexeril tid vs bid for muscle spasms. Incision clean and intact with no post operative changes. Lab work wnl. No wbc, fever, chills. Pt. reassured with plan and lab work. Pt. agreed with plan and to have close follow up with Dr. blanc as outpt.   Continue wbat b/l les.      Pt Name: GHISLAINE DELAROSA  MRN: [id number]      Pt is a 56yo Malec/o back pain and spasms x 1 day. Pt. states yesterday he was sitting in his chair when he suddenly felt spasms in the "T6" region with sharp radiating back pain. Pt. recently had L3-4 laminectomy, L5-S1 decompression on 12/16/22 with Dr. Blanc and discharged on 12/19/22. Pt. reports he stopped taking as much pain medication once discharged and takes flexeril twice a day. Pt. still has same burning sensation and decreased slt in left le preop, but states after surgery it is not as intense. Pt. is nervous because he lives by himself and wasn't sure what was going on and wanted to come to ED.       ROS is otherwise negative.    PAST MEDICAL & SURGICAL HISTORY:  anxiety      Allergies    No Known Allergies    Medications:    T(C): 36.8 (12-21-22 @ 14:48), Max: 36.8 (12-21-22 @ 10:45)  HR: 95 (12-21-22 @ 14:48) (95 - 100)  BP: 137/87 (12-21-22 @ 14:48) (122/80 - 137/87)  RR: 19 (12-21-22 @ 14:48) (18 - 19)  SpO2: 95% (12-21-22 @ 14:48) (95% - 96%)  Wt(kg): --    Gen: nervous/anxious  Neurological: no focal deficits  Rectal: not tested  Musculoskeletal:  Spine:skin intact, steristrips intact at incision site. No ecchymosis/sts/drainage noted. Mild ttp at lumbar region when incision is.   Lower extremities: skin intact, no erythema/ecchymosis/sts. SLT intact to patients baseline (Right>left), DP/PT 2+,   QUAD/HAM/EHL/FHL/TA/GS 5/5        A/P:  Pt is a 56yo Male s/p L3-4 laminectomy, L5-S1 decompression on 12/16/22 with postoperative lumbar spasms  -D/w Dr. Blanc  - Post operatively no deficits. Pt. counseled on properly using pain medications and can take flexeril tid vs bid for muscle spasms. Incision clean and intact with no post operative changes. Lab work wnl. No wbc, fever, chills. Pt. reassured with plan and lab work. Pt. agreed with plan and to have close follow up with Dr. blanc as outpt.   Continue wbat b/l les.      Pt Name: GHISLAINE DELAROSA  MRN: [id number]      Pt is a 58yo Malec/o back pain and spasms x 1 day. Pt. states yesterday he was sitting in his chair when he suddenly felt spasms in the "T6" region with sharp radiating back pain. Pt. recently had L3-4 laminectomy, L5-S1 decompression on 12/16/22 with Dr. Blanc and discharged on 12/19/22. Pt. reports he stopped taking as much pain medication once discharged and takes flexeril twice a day. Pt. still has same burning sensation and decreased slt in left le preop, but states after surgery it is not as intense. Pt. is nervous because he lives by himself and wasn't sure what was going on and wanted to come to ED.       ROS is otherwise negative.    PAST MEDICAL & SURGICAL HISTORY:  anxiety      Allergies    No Known Allergies    Medications:    T(C): 36.8 (12-21-22 @ 14:48), Max: 36.8 (12-21-22 @ 10:45)  HR: 95 (12-21-22 @ 14:48) (95 - 100)  BP: 137/87 (12-21-22 @ 14:48) (122/80 - 137/87)  RR: 19 (12-21-22 @ 14:48) (18 - 19)  SpO2: 95% (12-21-22 @ 14:48) (95% - 96%)  Wt(kg): --    Gen: nervous/anxious  Neurological: no focal deficits  Rectal: not tested  Musculoskeletal:  Spine:skin intact, steristrips intact at incision site. No ecchymosis/sts/drainage noted. Mild ttp at lumbar region when incision is.   Lower extremities: skin intact, no erythema/ecchymosis/sts. SLT intact to patients baseline (Right>left), DP/PT 2+,   QUAD/HAM/EHL/FHL/TA/GS 5/5        A/P:  Pt is a 58yo Male s/p L3-4 laminectomy, L5-S1 decompression on 12/16/22 with postoperative back spasms  -D/w Dr. Blanc  - Post operatively no deficits. Pt. counseled on properly using pain medications and can take flexeril tid vs bid for muscle spasms. Incision clean and intact with no post operative changes. Lab work wnl. No wbc, fever, chills. Pt. reassured with plan and lab work. Pt. agreed with plan and to have close follow up with Dr. blanc as outpt.   Continue wbat b/l les.

## 2022-12-21 NOTE — ED PROVIDER NOTE - PHYSICAL EXAMINATION
GEN: Well appearing, well developed, awake, alert, oriented to person, place, time/situation and in distress 2/2 pain. NTAF  ENT: Airway patent, Nasal mucosa clear. Mouth with normal mucosa.  EYES: Clear bilaterally. PERRL, EOMI  RESPIRATORY: Breathing comfortably with normal RR. No W/C/R, no hypoxia or resp distress.  CARDIAC: Regular rate and rhythm, no M/R/G  ABDOMEN: Soft, nontender, +bowel sounds, no rebound, rigidity, or guarding.  MSK: Range of motion is not limited, no deformities noted. +para lumbar and thoracic muscle tightness, please refer to ortho note for detailed exam.   NEURO: Alert and oriented, no focal deficits.  SKIN: Skin normal color for race, warm, dry, incision appears to be healing well - please refer to ortho note for detailed exam. no cellulitis, purulence, crepitus, drainage, bleeding, or gangrene.   PSYCH: Alert and oriented to person, place, time/situation. normal mood and affect. no apparent risk to self or others.

## 2022-12-27 DIAGNOSIS — D72.828 OTHER ELEVATED WHITE BLOOD CELL COUNT: ICD-10-CM

## 2022-12-27 DIAGNOSIS — F41.9 ANXIETY DISORDER, UNSPECIFIED: ICD-10-CM

## 2022-12-27 DIAGNOSIS — M48.061 SPINAL STENOSIS, LUMBAR REGION WITHOUT NEUROGENIC CLAUDICATION: ICD-10-CM

## 2022-12-27 DIAGNOSIS — M47.26 OTHER SPONDYLOSIS WITH RADICULOPATHY, LUMBAR REGION: ICD-10-CM

## 2022-12-27 DIAGNOSIS — M54.17 RADICULOPATHY, LUMBOSACRAL REGION: ICD-10-CM

## 2022-12-27 DIAGNOSIS — M48.07 SPINAL STENOSIS, LUMBOSACRAL REGION: ICD-10-CM

## 2023-01-07 NOTE — DISCHARGE NOTE PROVIDER - ATTENDING ATTESTATION STATEMENT
I have personally seen and examined the patient. I have collaborated with and supervised the normal (ped)...

## 2023-01-31 PROBLEM — Z00.00 ENCOUNTER FOR PREVENTIVE HEALTH EXAMINATION: Status: ACTIVE | Noted: 2023-01-31

## 2023-02-03 ENCOUNTER — APPOINTMENT (OUTPATIENT)
Dept: MRI IMAGING | Facility: HOSPITAL | Age: 58
End: 2023-02-03

## 2023-02-03 ENCOUNTER — OUTPATIENT (OUTPATIENT)
Dept: OUTPATIENT SERVICES | Facility: HOSPITAL | Age: 58
LOS: 1 days | End: 2023-02-03
Payer: OTHER MISCELLANEOUS

## 2023-02-03 PROCEDURE — 72158 MRI LUMBAR SPINE W/O & W/DYE: CPT

## 2023-02-03 PROCEDURE — A9585: CPT

## 2023-02-03 PROCEDURE — 72158 MRI LUMBAR SPINE W/O & W/DYE: CPT | Mod: 26

## 2024-12-10 NOTE — ED ADULT NURSE NOTE - MODE OF DISCHARGE
Head, normocephalic, atraumatic, Face, Face within normal limits, Ears, External ears within normal limits Ambulatory

## 2025-03-16 NOTE — ED ADULT TRIAGE NOTE - MODE OF ARRIVAL
Patient Education     Conteo de carbohidratos para adultos con diabetes   Conceptos Básicos   Redactado por los médicos y editores de [employer]   ¿Qué es el conteo de carbohidratos? -- El conteo de carbohidratos es un tipo de planificación de comidas que usan muchas personas con diabetes. Es pavan forma de calcular cuántos carbohidratos consumen.  Nuestro organismo transforma los alimentos que comemos en ellie tipos principales de nutrientes: carbohidratos, proteínas y grasas. Los carbohidratos son azúcares y almidones que provienen de los alimentos. El cuerpo usa los carbohidratos ness norma de energía.  ¿Por qué brooklynn contar los carbohidratos? -- Las personas que tienen diabetes deben prestar atención a la cantidad de carbohidratos que consumen, ya que los carbohidratos elevan el nivel de azúcar en james.  El conteo de carbohidratos le ayuda para lo siguiente:   Elegir la cantidad de insulina que usará antes de las comidas y meriendas - Si usa insulina antes de las comidas, la dosis depende de varias cosas, y pavan de ellas es la cantidad de carbohidratos que planea consumir. (También depende de cuánto ejercicio tenga pensado hacer y de fernando nivel de azúcar en james).   Planificar las comidas y meriendas del día - Puede utilizar el conteo de carbohidratos para calcular cuántos carbohidratos debe consumir en cada comida y merienda. [place] le ayudará a asegurarse de consumir la cantidad correcta todo el día.   Mantener controlado el nivel de azúcar en james - Distribuir los carbohidratos que consume a lo igor del día puede ayudar a que fernando nivel de azúcar en james no suba demasiado. Si usa insulina u otra medicina para la diabetes que puede causar un nivel bajo de azúcar en james, consumir aproximadamente la misma cantidad de carbohidratos en cada comida todos los ramirez también ayuda a impedir que el nivel de azúcar en james baje demasiado. Reducir la cantidad de carbohidratos que consume puede ayudarle a controlar mejor  la diabetes y evitar problemas médicos que esta enfermedad puede producir.  Fernando médico, enfermero o nutricionista (experto en alimentos) puede ayudarle a determinar cuántos carbohidratos debe tratar de consumir cada día. La cantidad dependerá de kermit hábitos de alimentación, fernando peso, fernando nivel de actividad y las medicinas que use para la diabetes.  Las personas que usan insulina antes de las comidas deben poner mucho cuidado al contar los carbohidratos de cada comida y merienda, ya que esto les permite usar la cantidad correcta de insulina. Si la dosis de insulina no es adecuada para la cantidad de carbohidratos, fernando nivel de azúcar en james podría bajar demasiado. Otras personas podrían ser un poco más flexibles, siempre y cuando consuman aproximadamente la misma cantidad de carbohidratos por día.  ¿Qué alimentos tienen carbohidratos? -- Los alimentos con muchos carbohidratos incluyen:   Granos - Entre ellos se cuentan el pan, las pastas, el arroz y los cereales.   Frutas y vegetales con almidón - Algunos vegetales con almidón son la papa, el maíz y la calabaza.   Leche y otros productos lácteos - Entre los productos lácteos se encuentran el queso y el yogur.   Alimentos con azúcar agregada - Entre ellos se cuentan los dulces y los alimentos horneados (ness las galletas y las tortas), y también las bebidas azucaradas ness los jugos y las gaseosas.  Lo mejor es que la mayor parte de los carbohidratos provengan de frutas, verduras, granos integrales (ness el pan, los cereales y el arroz integrales), y de leche y productos lácteos bajos en grasa.  ¿Cómo se cuentan los carbohidratos? -- Para contar los carbohidratos de los alimentos envasados, debe revisar los datos nutricionales en las etiquetas (si tienen etiqueta).  En la etiqueta (figura 1), revise la siguiente información:   Cantidad de “carbohidratos totales” - Indica cuántos carbohidratos contiene pavan porción del alimento. Si come pavan porción, entonces la cantidad  "de carbohidratos que come es la misma cantidad que los carbohidratos totales.   “Tamaño de la porción” - Indica la cantidad de alimento en pavan porción. Si come 2 porciones, la cantidad de carbohidratos será dos veces la cantidad de carbohidratos mencionada.   “Fibra dietaria” - La fibra es un carbohidrato que no se digiere, esto significa que no eleva el azúcar en james. Los alimentos con mucha fibra pueden ayudar a controlar el azúcar en james. Si un alimento tiene más de 5 gramos (g) de fibra, necesita menos insulina si consume abraham alimento. Por eso, para calcular pavan dosis de insulina, solo debe contar los carbohidratos que no provengan de la fibra (figura 1).  ¿Qué es el reemplazo de carbohidratos? -- Reemplazar los carbohidratos o \"sistema de reemplazos\", es pavan manera de planificar las comidas sin leer las etiquetas. [place] puede ser útil, ya que muchos alimentos no vienen con pavan etiqueta de datos nutricionales.  El sistema de reemplazos consiste en saber qué cantidad de distintos alimentos contiene aproximadamente 15 gramos de carbohidratos (tabla 1 y tabla 2 y tabla 3). Fernando médico, enfermero o nutricionista le da pavan cierta cantidad de opciones de carbohidratos para consumir en cada comida y merienda (tabla 4). Cada opción es pavan porción de comida que contiene alrededor de 15 gramos de carbohidratos. [place] kermit opciones hará que le resulte más fácil reemplazar pavan opción de carbohidrato por otra a la hora de planear kemrit comidas y meriendas. Por ejemplo, podría reemplazar pavan manzana carolann por 1/3 de taza de pasta.  ¿Cómo puedo planificar mis comidas? -- [place], asegúrese de saber cuántos carbohidratos debe consumir por día. Si no está seguro, pregunte a fernando médico, enfermero o nutricionista.  Estas sugerencias podrían ser de ayuda:   Distribuya kermit carbohidratos en 4 a 6 comidas pequeñas todos los días, en lugar de 3 grandes   Coma pavan cantidad similar de carbohidratos en cada comida, por ejemplo, en cada " "gil   Coma kermit comidas a pavan hora similar todos los días   Planifique kermit comidas con tiempo   Use el \"método del plato\", pavan manera más simple de asegurarse de tener un buen equilibrio de carbohidratos y otros nutrientes en cada comida. Por lo general no es tan exacto ness contar todos los carbohidratos, puede ser útil para quienes tienen dificultades con el conteo. Si usa insulina antes de las comidas, lo mejor es ajustar la dosis de insulina contando cuántos carbohidratos planea consumir en lugar de usar el método del plato.  En el método del plato, comienza con un plato de alrededor de 9 pulgadas (23 cm) de diámetro. Luego, llena (figura 2):   1/2 plato con verduras sin almidón   1/4 de plato con proteína   1/4 de plato con carbohidratos   Siga las instrucciones de fernando médico acerca de cómo y cuándo revisarse el nivel de azúcar en james. [place] puede ayudarle a conocer la manera en que ciertos alimentos afectan fernando azúcar en james.   Lleve un registro de kermit comidas y los niveles de azúcar en james. Muéstreselo al médico o enfermero para que pueda ajustarle el plan de tratamiento, si es necesario. Si usa insulina, también tendrá que llevar un registro de kermit rutinas de ejercicio y cuánta insulina recibe con cada dosis.   Si usa insulina, asegúrese de saber cómo utilizarla; por ejemplo, debe saber cómo ajustar la dosis según fernando nivel de azúcar en james y la cantidad de carbohidratos que planea consumir.   Recuerde que otras cosas, además de los carbohidratos, pueden elevar o disminuir el nivel de azúcar en james, por ejemplo realizar ejercicio físico, enfermarse, beber alcohol, viajar y tener estrés. Si usa insulina, asegúrese de saber cuándo y cómo ajustar la dosis en esas situaciones.  Si tiene dificultad para contar los carbohidratos o mantener fernando nivel de azúcar en james bajo control, hable con fernando médico o enfermero, Puede brindarle ayuda. El nutricionista también puede ayudarle a planificar menús " específicos para consumir la cantidad correcta de carbohidratos por día.  Para obtener más información, también puede conseguir un libro sobre el conteo de carbohidratos o visitar el sitio web de la Asociación [place] para la Diabetes (American Diabetes Association) (www.diabetes.org).  Todos los artículos se actualizan a medida que se descubre nueva evidencia y culmina nuestro proceso de evaluación por homólogos   Pili artículo se recuperó de UpToDate el: Mar 27, 2024.  Artículo 11879 Versión [id number].es-419.1  Release: 32.2.4 - C32.85  © 2024 [employer] Todos los derechos reservados.  figura 1: Contar los carbohidratos     Para determinar el conteo de carbohidratos netos en 1 porción, comience con el número de gramos de carbohidratos totales (46 gramos) y luego reste el número de gramos de fibra dietaria (7 gramos). También es importante observar el tamaño de la porción. En pili ejemplo, el conteo de carbohidratos es de 39 gramos. Puede usar pili número a la hora de contar los carbohidratos para determinar fernando dosis de insulina.  Gráfico [id number] Versión 8.0  tabla 1: Panes y cereales con 15 gramos de carbohidratos*  Pan    Alimento  Tamaño de la porción    Bagel 1/4 de bagel zeeshan (1 oz)   Galleta 1 galleta (2.5 pulgadas de diámetro)   Pan, reducido en calorías, dietético 2 rebanadas (1.5 oz)   Kamara de harina maíz 1 cubo de 1.75 pulgadas (1.5 oz)   Panecillo inglés 1/2 panecillo   Pan para hot dog o hamburguesa 1/2 pan (3/4 de oz)   Naan, chapati o roti 1 oz   Panqueque 1 panqueque (4 pulgadas de diámetro, 1/4 de pulgada de grosor)   Lu (6 pulgadas de diámetro) 1/2 lu   Tortilla de harina de maíz 1 tortilla pequeña (6 pulgadas de diámetro)   Tortilla de harina de dino (rodney o integral) 1 tortilla pequeña (6 pulgadas de diámetro) o 1/3 de tortilla zeeshan (10 pulgadas de diámetro)   Gofre 1 gofre (abraham de 4 pulgadas o 4 pulgadas de diámetro)   Cereales y granos (incluidas las pastas y el arroz)   "  Alimento  Tamaño de la porción (alimento cocido)    Cebada, cuscús, mijo, pasta (harina rodney o integral, todas las formas y tamaños), polenta, quinoa (todos los colores) o arroz (pete, integral y otros colores y variedades) 1/3 de taza   Cereal de salvado (ramitas, bolitas o copos), almohadillas de dino (sabor natural) o cereal azucarado 1/2 taza   Bulgur, kasha, tabule o arroz eliud 1/2 taza   Granola 1/4 de taza   Cereal caliente (demetria, cereal de demetria, sémola) 1/2 taza   Cereal listo para consumir, sin endulzar 3/4 de taza   * En el justa de los panes y cereales, 15 gramos de carbohidratos se consideran 1 porción o pavan opción para las personas que deben contar los carbohidratos.  [facility] [id number] Versión 1.0  tabla 2: Frutas con 15 gramos de carbohidratos*  Alimento  Tamaño de la porción    Puré de manzana, sin endulzar 1/2 taza   Plátano 1 plátano (banana) muy pequeño, de alrededor de 4 pulgadas de igor (4 oz)   Arándanos azules 3/4 de taza   Frutas deshidratadas (arándanos azules, cerezas, arándanos rojos, frutas mixtas, uvas pasas) 2 cdas.   Fruta, enlatada 1/2 taza   Fruta, entera, pequeña (manzana) 1 fruta pequeña (4 oz)   Fruta, entera, mediana (nectarina, naranja, kimberly, mandarina) 1 fruta mediana (6 oz)   Jugo de fruta, sin endulzar 1/2 taza   Uvas 17 uvas pequeñas (3 oz)   Melón, en cubos 1 taza   Fresas, enteras 1 taza y 1/4   Donde se indica, el peso (onzas) incluye la cáscara o la piel y las semillas. Si tiene dudas acerca de si la fruta es del tamaño correcto para 1 porción, puede usar pavan balanza de cocina para sky el peso de la fruta.  * En el justa de las frutas, 15 gramos de carbohidratos se consideran 1 porción o pavan \"opción\" para las personas que deben contar los carbohidratos.  Gráfico [id number] Versión 1.0  tabla 3: Verduras con almidón con 15 gramos de carbohidratos*  Alimento  Tamaño de la porción (alimento cocido)    Yuca, pituca o plátano (macho) 1/3 de taza   Maíz, arvejas, verduras " [employer] "mixtas o chirivías 1/2 taza   Salsa marinara, salsa para pasta o zhao para espagueti 1/2 taza   Verduras mixtas (con maíz o arvejas) 1 taza   Nate, al horno sin pelar 1/4 zeeshan (3 onzas)   Nate, a la francesa (horneadas) 1 taza (2 onzas)   Nate, en puré con leche y grasa 1/2 taza   Zapallo (anco, calabaza) 1 taza   Ñame o batata, común 1/2 taza (3 1/2 onzas)   Si tiene dudas acerca de si la verdura es del tamaño correcto para 1 porción, puede usar pavan balanza de cocina para sky el peso de la verdura.  * En el justa de las verduras con almidón, 15 gramos de carbohidratos se consideran 1 porción o pavan \"opción\" para las personas que deben contar los carbohidratos.  Gráfico [id number] Versión 1.0  tabla 4: Ejemplo de plan de comidas del sistema de reemplazos  Hora  Patrón de reemplazos  Ejemplo de menú  Cantidad de carbohidratos (g)    8 am 3 grupos de carbohidratos    2 almidones 1 panecillo inglés 30    1 fruta 1 1/4 tazas de fresas 15    1 shlomo de proteínas 1/4 de taza de requesón -    1 shlomo de grasas 1 cdta. de margarina -      Total: 45    Mediodía 4 grupos de carbohidratos    2 almidones 2 rebanadas de pan 30    1 fruta 1 naranja 15    1 verdura 1 taza de ensalada -    1 lácteo 8 oz de leche descremada 12    3 grupos de proteínas 3 oz de stephani -    1 shlomo de grasas 1 cda. de mayonesa baja en grasa -      Total: 57    3 pm 1 shlomo de carbohidratos    1 fruta o 1 almidón 1 manzana o 6 galletas de sal 15      Total: 15    6 pm 4 grupos de carbohidratos    2 almidones 1 taza de nate 30    1 fruta 1/2 taza de ensalada de frutas 15    1 verdura 1 taza de ensalada -    1 lácteo 8 oz de leche descremada 12    6 grupos de proteínas 6 oz de pescado -    1 shlomo de grasas 2 cdas. de aderezo para ensalada bajo en grasa -      Total: 57    9 pm 1 shlomo de carbohidratos    1 almidón 6 galletas de sal 15    1 proteína 2 cdas. de mantequilla de maní -      Total: 15    [facility] [id number] Versión 3.0  figura 2: El \"método del " "plato\"     En el método del plato, comienza con un plato de alrededor de 9 pulgadas (23 cm) de diámetro. Luego, llena 1/2 plato con verduras sin almidón, 1/4 de plato con proteína y 1/4 de plato con carbohidratos.  Gráfico [id number] Versión 2.0  Exención de responsabilidad y uso de la información del consumidor   Descargo de responsabilidad: esta información generalizada es un resumen limitado de información sobre el diagnóstico, el tratamiento y/o los medicamentos. No pretende ser exhaustiva y se debe utilizar ness herramienta para ayudar al usuario a comprender y/o evaluar las posibles opciones de diagnóstico y tratamiento. No incluye toda la información sobre afecciones, tratamientos, medicamentos, efectos secundarios o riesgos puedan ser aplicables a un paciente específico. No tiene el propósito de servir ness recomendación médica ni de sustituir la recomendación médica, el diagnóstico o el tratamiento de un profesional de atención médica que se base en el examen y la evaluación de artemio profesional de la wilfrid respecto a las circunstancias específicas y únicas del paciente. Los pacientes deben hablar con un profesional de atención médica para obtener información completa sobre fernando wilfrid, cuestiones médicas y opciones de tratamiento, incluidos los riesgos o los beneficios relacionados con el uso de medicamentos. Esta información no certifica que los tratamientos o medicamentos chris seguros, eficaces o estén aprobados para tratar a un paciente específico. [employer] y kermit afiliados renuncian a cualquier garantía o responsabilidad relacionada con esta información o el uso de la misma.El uso de esta información está sujeto a las Condiciones de uso, disponibles en https://www.Qualisteoer.com/en/know/clinical-effectiveness-terms. 2024© [employer] y kermit afiliados y/o licenciantes. Todos los derechos reservados.  Copyright   © 2024 [employer] Todos los derechos reservados.    " Walk in

## (undated) DEVICE — STAPLER SKIN PROXIMATE

## (undated) DEVICE — DRAPE INSTRUMENT POUCH 6.75" X 11"

## (undated) DEVICE — WARMING BLANKET UPPER ADULT

## (undated) DEVICE — SUT VICRYL 2-0 27" CT-1 UNDYED

## (undated) DEVICE — GLV 9 PROTEXIS ORTHO (BROWN)

## (undated) DEVICE — SUT VICRYL 2-0 27" SH UNDYED

## (undated) DEVICE — DRAPE 3/4 SHEET 52X76"

## (undated) DEVICE — DRAPE BACK TABLE COVER 80X90"

## (undated) DEVICE — SUT STRATAFIX SYMMETRIC PDS 1 45CM OS-6

## (undated) DEVICE — PACK SPINE

## (undated) DEVICE — GOWN TRIMAX XXL

## (undated) DEVICE — VENODYNE/SCD SLEEVE CALF MEDIUM

## (undated) DEVICE — DRAPE 1/2 SHEET 40X57"

## (undated) DEVICE — MIDAS REX LEGEND MATCH HEAD FLUTED LG BORE 3.0MM X 14CM

## (undated) DEVICE — PREP DURAPREP 26CC

## (undated) DEVICE — MIDAS REX LEGEND BALL FLUTED LG BORE 5.0MM X 14CM